# Patient Record
Sex: FEMALE | Race: WHITE | NOT HISPANIC OR LATINO | Employment: UNEMPLOYED | URBAN - METROPOLITAN AREA
[De-identification: names, ages, dates, MRNs, and addresses within clinical notes are randomized per-mention and may not be internally consistent; named-entity substitution may affect disease eponyms.]

---
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
## 2020-05-11 PROBLEM — Z3A.38 38 WEEKS GESTATION OF PREGNANCY: Status: RESOLVED | Noted: 2019-05-30 | Resolved: 2020-05-11

## 2020-09-10 ENCOUNTER — OFFICE VISIT (OUTPATIENT)
Dept: OBSTETRICS AND GYNECOLOGY | Facility: CLINIC | Age: 20
End: 2020-09-10
Payer: MEDICAID

## 2020-09-10 VITALS
SYSTOLIC BLOOD PRESSURE: 126 MMHG | DIASTOLIC BLOOD PRESSURE: 70 MMHG | HEIGHT: 60 IN | WEIGHT: 154.63 LBS | BODY MASS INDEX: 30.36 KG/M2

## 2020-09-10 DIAGNOSIS — Z12.4 ROUTINE PAPANICOLAOU SMEAR: Primary | ICD-10-CM

## 2020-09-10 PROCEDURE — 99213 OFFICE O/P EST LOW 20 MIN: CPT | Mod: PBBFAC,PO | Performed by: OBSTETRICS & GYNECOLOGY

## 2020-09-10 PROCEDURE — 88142 CYTOPATH C/V THIN LAYER: CPT

## 2020-09-10 PROCEDURE — 99999 PR PBB SHADOW E&M-EST. PATIENT-LVL III: ICD-10-PCS | Mod: PBBFAC,,, | Performed by: OBSTETRICS & GYNECOLOGY

## 2020-09-10 PROCEDURE — 99395 PR PREVENTIVE VISIT,EST,18-39: ICD-10-PCS | Mod: S$PBB,,, | Performed by: OBSTETRICS & GYNECOLOGY

## 2020-09-10 PROCEDURE — 99999 PR PBB SHADOW E&M-EST. PATIENT-LVL III: CPT | Mod: PBBFAC,,, | Performed by: OBSTETRICS & GYNECOLOGY

## 2020-09-10 PROCEDURE — 99395 PREV VISIT EST AGE 18-39: CPT | Mod: S$PBB,,, | Performed by: OBSTETRICS & GYNECOLOGY

## 2020-09-10 NOTE — PROGRESS NOTES
HPI:   20 y.o.   OB History        1    Para   1    Term   1       0    AB   0    Living   1       SAB   0    TAB   0    Ectopic   0    Multiple   0    Live Births   1              No LMP recorded. Patient has had an implant.    Patient is  here for her annual gynecologic exam.  She has no complaints.     ROS:  GENERAL: No fever, chills, fatigability or weight loss.  SKIN: No rashes, itching or changes in color or texture of skin.  HEAD: No headaches or recent head trauma.  EYES: Visual acuity fine. No photophobia, ocular pain or diplopia.  EARS: Denies ear pain, discharge or vertigo.  NOSE: No loss of smell, no epistaxis or postnasal drip.  MOUTH & THROAT: No hoarseness or change in voice. No excessive gum bleeding.  NODES: Denies swollen glands.  CHEST: Denies VALLE, cyanosis, wheezing, cough and sputum production.  CARDIOVASCULAR: Denies chest pain, PND, orthopnea or reduced exercise tolerance.  ABDOMEN: Appetite fine. No weight loss. Denies diarrhea, abdominal pain, hematemesis or blood in stool.  URINARY: No flank pain, dysuria or hematuria.  PERIPHERAL VASCULAR: No claudication or cyanosis.  MUSCULOSKELETAL: No joint stiffness or swelling. Denies back pain.  NEUROLOGIC: No history of seizures, paralysis, alteration of gait or coordination.    PE:   /70   Ht 5' (1.524 m)   Wt 70.1 kg (154 lb 9.6 oz)   Breastfeeding No   BMI 30.19 kg/m²   APPEARANCE: Well nourished, well developed, in no acute distress.  NECK: Neck symmetric without masses or thyromegaly.  BREASTS: Symmetrical, no skin changes or visible lesions. No palpable masses, nipple discharge or adenopathy bilaterally.  ABDOMEN: Flat. Soft. No tenderness or masses. No hepatosplenomegaly. No hernias. No CVA tenderness.  VULVA: No lesions. Normal female genitalia.  URETHRAL MEATUS: Normal size and location, no lesions, no prolapse.  URETHRA: No masses, tenderness, prolapse or scarring.  VAGINA: Moist and well rugated, no discharge, no  significant cystocele or rectocele.  CERVIX: No lesions and discharge. PAP done.  UTERUS: Normal size, regular shape, mobile, non-tender, bladder base nontender.  ADNEXA: No masses, tenderness or CDS nodularity.  ANUS PERINEUM: Normal.    PROCEDURES:  Pap smear    Assessment:  Normal Gynecologic Exam    Plan:  Mammogram and Colonoscopy if indicated by current recommendations.  Return to clinic in one year or for any problems or complaints.  nexplanon  prev abn pap

## 2020-09-17 ENCOUNTER — TELEPHONE (OUTPATIENT)
Dept: OBSTETRICS AND GYNECOLOGY | Facility: CLINIC | Age: 20
End: 2020-09-17

## 2020-09-17 NOTE — TELEPHONE ENCOUNTER
----- Message from Sharon Gregory sent at 9/17/2020 11:27 AM CDT -----  Regarding: pap results  Type:  Needs Medical Advice    Who Called: patient   Reason: patient would like a call back concerning annual check up results  Would the patient rather a call back or a response via [facility]? Call back   Best Call Back Number: [phone]  Additional Information: n/a

## 2020-09-22 ENCOUNTER — TELEPHONE (OUTPATIENT)
Dept: OBSTETRICS AND GYNECOLOGY | Facility: CLINIC | Age: 20
End: 2020-09-22

## 2020-09-22 NOTE — TELEPHONE ENCOUNTER
----- Message from Promise Mathews sent at 9/22/2020  9:11 AM CDT -----  Type:  Needs Medical Advice    Who Called: self  Reason:test results   Would the patient rather a call back or a response via [facility]? Call  Best Call Back Number: [phone]  Additional Information: none

## 2020-09-22 NOTE — TELEPHONE ENCOUNTER
Pt notified that her pap results are not back yet and we send out a letter if it comes back normal.    Pt is requesting a call about her pap letter

## 2020-09-26 LAB
FINAL PATHOLOGIC DIAGNOSIS: NORMAL
Lab: NORMAL

## 2020-09-28 ENCOUNTER — TELEPHONE (OUTPATIENT)
Dept: OBSTETRICS AND GYNECOLOGY | Facility: CLINIC | Age: 20
End: 2020-09-28

## 2020-09-28 NOTE — TELEPHONE ENCOUNTER
----- Message from Promise Mathews sent at 9/28/2020 10:40 AM CDT -----  Type:  Needs Medical Advice     Who Called: self  Reason:test results   Would the patient rather a call back or a response via [facility]? Call  Best Call Back Number: [phone]  Additional Information: none

## 2020-09-28 NOTE — TELEPHONE ENCOUNTER
----- Message from Ursula Coulter sent at 9/28/2020  1:27 PM CDT -----  Regarding: pt call back  Contact: pt  Patient Returning Call    Who Called pt   Who Left Message For Patient If Any racquel alicia  Does The Patient Know What This Is Regarding not sure   Would The Patient Rather A Call Back Or [facility] [phone]   Additional Information

## 2020-09-28 NOTE — TELEPHONE ENCOUNTER
----- Message from Rosita Tabor sent at 9/28/2020 10:51 AM CDT -----  Contact: pt, [phone] (M)  Patient called in returning your call. Please advise.

## 2020-12-03 ENCOUNTER — TELEPHONE (OUTPATIENT)
Dept: OBSTETRICS AND GYNECOLOGY | Facility: CLINIC | Age: 20
End: 2020-12-03

## 2020-12-03 NOTE — TELEPHONE ENCOUNTER
Pt states that she hasn't had a cycle in over a year and now she is having a dark discharge. Pt notified this is probably just old blood because she hasn't had a cycle in a year

## 2020-12-03 NOTE — TELEPHONE ENCOUNTER
----- Message from Melba Bueno sent at 12/3/2020  9:49 AM CST -----  Type:  Needs Medical Advice    Who Called: pt  Advice Regarding: no period since nexplanon, now having blood clots  Would the patient rather a call back or a response via [facility]? call  Best Call Back Number:   Additional Information: n/a

## 2021-01-23 ENCOUNTER — NURSE TRIAGE (OUTPATIENT)
Dept: ADMINISTRATIVE | Facility: CLINIC | Age: 21
End: 2021-01-23

## 2021-01-25 RX ORDER — NORETHINDRONE ACETATE AND ETHINYL ESTRADIOL .02; 1 MG/1; MG/1
1 TABLET ORAL DAILY
Qty: 30 TABLET | Refills: 0 | OUTPATIENT
Start: 2021-01-25 | End: 2022-02-08

## 2022-01-18 ENCOUNTER — HOSPITAL ENCOUNTER (EMERGENCY)
Facility: HOSPITAL | Age: 22
Discharge: HOME | End: 2022-01-18
Attending: EMERGENCY MEDICINE
Payer: MEDICAID

## 2022-01-18 VITALS
TEMPERATURE: 98 F | HEART RATE: 83 BPM | HEIGHT: 60 IN | BODY MASS INDEX: 29.45 KG/M2 | RESPIRATION RATE: 16 BRPM | SYSTOLIC BLOOD PRESSURE: 123 MMHG | WEIGHT: 150 LBS | OXYGEN SATURATION: 100 % | DIASTOLIC BLOOD PRESSURE: 58 MMHG

## 2022-01-18 DIAGNOSIS — R21 RASH: Primary | ICD-10-CM

## 2022-01-18 PROCEDURE — 99283 EMERGENCY DEPT VISIT LOW MDM: CPT

## 2022-01-18 RX ORDER — PERMETHRIN 50 MG/G
CREAM TOPICAL
Qty: 60 G | Refills: 2 | OUTPATIENT
Start: 2022-01-18 | End: 2022-02-08

## 2022-01-18 NOTE — ED PROVIDER NOTES
Encounter Date: 2022    SCRIBE #1 NOTE: I, Negro Robles, am scribing for, and in the presence of, Arin Almendarez PA-C.       History     Chief Complaint   Patient presents with    Rash     Pt with rash x 4 days. Took benadryl yesterday with no relief      Kaye Long is a 21 y.o. female who presents to the Emergency Department for evaluation of worsening generalized hives persisting for 1 week. Patient states that the hives began on her wrists and have now spread across her body. She endorses the use of calamine lotion and benadryl with no relief. She states her daughter is exhibiting similar hives. Patient expresses concerns that their hives are secondary to moving upstairs 1 week ago where their previous room mates once lived. She describes that she feels as though something is biting her. Patient reports that her significant other does not have any hives despite sleeping in the same bed. She states the mattress is 2 years old. She does not endorse any new detergents, soaps, or animals in the house. Patient denies any other associated symptoms.    The history is provided by the patient.     Review of patient's allergies indicates:  No Known Allergies  History reviewed. No pertinent past medical history.  Past Surgical History:   Procedure Laterality Date     SECTION N/A 2019    Procedure:  SECTION;  Surgeon: Michael A. Wiedemann, MD;  Location: [facility] L&D;  Service: OB/GYN;  Laterality: N/A;    DENTAL SURGERY       Family History   Problem Relation Age of Onset    Hypertension Father      Social History     Tobacco Use    Smoking status: Passive Smoke Exposure - Never Smoker    Smokeless tobacco: Never Used   Substance Use Topics    Alcohol use: No    Drug use: No     Review of Systems   Constitutional: Negative for fever.   HENT: Negative for sore throat.    Eyes: Negative for pain.   Respiratory: Negative for shortness of breath.    Cardiovascular: Negative for chest  pain.   Gastrointestinal: Negative for abdominal pain and nausea.   Genitourinary: Negative for dysuria.   Musculoskeletal: Negative for back pain.   Skin: Positive for rash.   Neurological: Negative for weakness.   All other systems reviewed and are negative.      Physical Exam     Initial Vitals [01/18/22 1141]   BP Pulse Resp Temp SpO2   (!) 123/58 83 16 97.9 °F (36.6 °C) 100 %      MAP       --         Physical Exam    Nursing note and vitals reviewed.  Constitutional: She appears well-developed and well-nourished. She is not diaphoretic. No distress.   HENT:   Head: Normocephalic and atraumatic.   Nose: Nose normal.   Eyes: EOM are normal. Pupils are equal, round, and reactive to light.   Neck: Neck supple.   Normal range of motion.  Cardiovascular: Normal rate and regular rhythm.   No murmur heard.  Pulmonary/Chest: No respiratory distress.   Abdominal: Abdomen is soft. Bowel sounds are normal. She exhibits no distension. There is no abdominal tenderness. There is no rebound and no guarding.   Musculoskeletal:         General: No tenderness or edema. Normal range of motion.      Cervical back: Normal range of motion and neck supple.     Neurological: She is alert and oriented to person, place, and time. No cranial nerve deficit.   Skin: Skin is warm. Rash noted. No erythema.   There are multiple erythematous insect bites noted to the chest, bilateral upper extremities.         ED Course   Procedures  Labs Reviewed - No data to display       Imaging Results    None          Medications - No data to display  Medical Decision Making:   History:   Old Medical Records: I decided to obtain old medical records.       APC / Resident Notes:   This is an urgent evaluation of a 21-year-old female presents to the Emergency Department complaining of rash.  Symptoms started a few days ago.  Her daughter also has similar symptoms.  Of note, the patient recently moved to a different bedroom of her house were previous people  or living.    The patient is currently afebrile and nontoxic in appearance.  Vital signs are stable.  Physical exam is consistent with multiple small, erythematous bumps to the bilateral upper extremities and chest.  Some appear to be in a linear pattern.  I believe this is bedbugs versus scabies.  I will treat with permethrin and have them look for bedbugs.  The patient's  states that he has seen 2 small dead bed bugs in the past 2 days.  The patient verbalized understanding and agreement.  She is currently safe and stable for discharge at this time.     Scribe Attestation:   Scribe #1: I performed the above scribed service and the documentation accurately describes the services I performed. I attest to the accuracy of the note.                 Clinical Impression:   Final diagnoses:  [R21] Rash (Primary)          ED Disposition Condition    Discharge Stable        ED Prescriptions     Medication Sig Dispense Start Date End Date Auth. Provider    permethrin (ELIMITE) 5 % cream Apply to the body, leave for 8 hours, then wash.  May repeat in one week. 60 g 1/18/2022  Arin Almendarez PA-C        Follow-up Information     Follow up With Specialties Details Why Contact Info    Marita Norris MD Pediatrics   [address]  [place] [address]  [phone]           I, Arin Almendarez PA-C, personally performed the services described in this documentation. All medical record entries made by the scribe were at my direction and in my presence. I have reviewed the chart and agree that the record reflects my personal performance and is accurate and complete.     Arin Almendarez PA-C  01/18/22 1257

## 2022-01-18 NOTE — ED TRIAGE NOTES
Pt. Reports for the past few days she has been having a rash on her body. Pt. Reports she recently moved into an appt and that is when the rash started. Pt. Reports her daughter has the same type of rash.

## 2022-02-08 ENCOUNTER — PATIENT OUTREACH (OUTPATIENT)
Dept: EMERGENCY MEDICINE | Facility: HOSPITAL | Age: 22
End: 2022-02-08

## 2022-02-08 ENCOUNTER — HOSPITAL ENCOUNTER (EMERGENCY)
Facility: HOSPITAL | Age: 22
Discharge: HOME | End: 2022-02-08
Attending: EMERGENCY MEDICINE
Payer: MEDICAID

## 2022-02-08 VITALS
HEART RATE: 100 BPM | OXYGEN SATURATION: 96 % | RESPIRATION RATE: 17 BRPM | SYSTOLIC BLOOD PRESSURE: 126 MMHG | TEMPERATURE: 98 F | DIASTOLIC BLOOD PRESSURE: 64 MMHG

## 2022-02-08 DIAGNOSIS — Z3A.08 8 WEEKS GESTATION OF PREGNANCY: Primary | ICD-10-CM

## 2022-02-08 LAB
B-HCG UR QL: POSITIVE
CTP QC/QA: YES

## 2022-02-08 PROCEDURE — 99282 EMERGENCY DEPT VISIT SF MDM: CPT

## 2022-02-08 PROCEDURE — 81025 URINE PREGNANCY TEST: CPT | Performed by: EMERGENCY MEDICINE

## 2022-02-08 NOTE — ED PROVIDER NOTES
Encounter Date: 2022       History     Chief Complaint   Patient presents with    Possible Pregnancy     20 yo female to triage w/ requesting UPT and CATHERINE. Pt says she took a home UPT and it was positive but wants another test. Also states she already has an appointment set for the . VSS, NAD, AAOx4     20 y/o  female to ED for evaluation of possible pregnancy. Pt states she took a home pregnancy test this morning and it was positive. LMP Dec 9-12. Reports constipation and nausea x 2 weeks. Denies abdominal pain, dysuria, vaginal bleeding, and vaginal discharge. Pt states she would like a pregnancy test and an ultrasound today while in the ED.         Review of patient's allergies indicates:  No Known Allergies  History reviewed. No pertinent past medical history.  Past Surgical History:   Procedure Laterality Date     SECTION N/A 2019    Procedure:  SECTION;  Surgeon: Michael A. Wiedemann, MD;  Location: [facility] L&D;  Service: OB/GYN;  Laterality: N/A;    DENTAL SURGERY       Family History   Problem Relation Age of Onset    Hypertension Father      Social History     Tobacco Use    Smoking status: Passive Smoke Exposure - Never Smoker    Smokeless tobacco: Never Used   Substance Use Topics    Alcohol use: No    Drug use: No     Review of Systems   Constitutional: Negative.    HENT: Negative.    Respiratory: Negative.    Cardiovascular: Negative.    Gastrointestinal: Positive for constipation and nausea.   Genitourinary: Negative.  Negative for dysuria, vaginal bleeding and vaginal discharge.   Musculoskeletal: Negative.    Neurological: Negative.    All other systems reviewed and are negative.      Physical Exam     Initial Vitals [22 1203]   BP Pulse Resp Temp SpO2   126/64 100 17 98.2 °F (36.8 °C) 96 %      MAP       --         Physical Exam    Nursing note and vitals reviewed.  Constitutional: She appears well-developed and well-nourished. No distress.   HENT:   Head:  Normocephalic and atraumatic.   Nose: Nose normal.   Eyes: Conjunctivae are normal.   Neck: Neck supple.   Normal range of motion.  Cardiovascular: Normal rate.   Pulmonary/Chest: No respiratory distress.   Abdominal: Abdomen is soft. There is no abdominal tenderness.   Musculoskeletal:         General: Normal range of motion.      Cervical back: Normal range of motion and neck supple.     Neurological: She is alert and oriented to person, place, and time.   Skin: Skin is warm and dry.   Psychiatric: She has a normal mood and affect. Thought content normal.         ED Course   Procedures  Labs Reviewed   POCT URINE PREGNANCY - Abnormal; Notable for the following components:       Result Value    POC Preg Test, Ur Positive (*)     All other components within normal limits          Imaging Results    None          Medications - No data to display        APC / Resident Notes:   This is an urgent evaluation of a 21-year-old  female presents to the emergency department requesting a pregnancy test.  She adamantly denies any dysuria, abdominal pain, vaginal bleeding or discharge.  She states that she took a pregnancy test at home which was positive.    The patient is currently afebrile and nontoxic in appearance.  Vital signs are stable.  Physical exam was grossly unremarkable.  I carefully considered but doubt spontaneous , ectopic pregnancy.  Her UPT was positive.  I will encourage prenatal vitamins.  The patient verbalized understanding and agreement.  She has an OBGYN follow-up appointment already scheduled.                 Clinical Impression:   Final diagnoses:  [Z3A.08] 8 weeks gestation of pregnancy (Primary)          ED Disposition Condition    Discharge Stable        ED Prescriptions     None        Follow-up Information     Follow up With Specialties Details Why Contact Info    Marita Norris MD Pediatrics   [address]  [place] [address]  [phone]             Arin Almendarez,  TRACI  02/08/22 1716

## 2022-02-08 NOTE — DISCHARGE INSTRUCTIONS
Take medication called Colace over-the-counter to help with constipation.  You may take prenatal vitamins daily.  Follow-up with your OBGYN as scheduled.

## 2022-02-08 NOTE — ED TRIAGE NOTES
Pt. Reports her LMP was on 12/12/2021. Pt. Reports she took a pregnancy test at home and it was positive. Pt. reports she has an appt with her Ob-GYN for next Monday.

## 2022-02-08 NOTE — PROGRESS NOTES
Patient declined ED navigation assessment and denied any needs at this time.     Ingrid Gastelum  ED Navigator  [facility]  [phone]

## 2022-02-17 ENCOUNTER — HOSPITAL ENCOUNTER (EMERGENCY)
Facility: HOSPITAL | Age: 22
Discharge: HOME | End: 2022-02-17
Attending: EMERGENCY MEDICINE
Payer: MEDICAID

## 2022-02-17 VITALS
BODY MASS INDEX: 30.82 KG/M2 | WEIGHT: 157 LBS | SYSTOLIC BLOOD PRESSURE: 113 MMHG | TEMPERATURE: 98 F | RESPIRATION RATE: 18 BRPM | HEIGHT: 60 IN | DIASTOLIC BLOOD PRESSURE: 69 MMHG | HEART RATE: 100 BPM | OXYGEN SATURATION: 99 %

## 2022-02-17 DIAGNOSIS — O20.0 THREATENED MISCARRIAGE IN EARLY PREGNANCY: Primary | ICD-10-CM

## 2022-02-17 LAB
ABO GROUP BLD: NORMAL
ALBUMIN SERPL BCP-MCNC: 4.1 G/DL (ref 3.5–5.2)
ALP SERPL-CCNC: 80 U/L (ref 55–135)
ALT SERPL W/O P-5'-P-CCNC: 10 U/L (ref 10–44)
ANION GAP SERPL CALC-SCNC: 11 MMOL/L (ref 8–16)
AST SERPL-CCNC: 12 U/L (ref 10–40)
B-HCG UR QL: POSITIVE
BACTERIA #/AREA URNS HPF: ABNORMAL /HPF
BASOPHILS # BLD AUTO: 0.06 K/UL (ref 0–0.2)
BASOPHILS NFR BLD: 0.4 % (ref 0–1.9)
BILIRUB SERPL-MCNC: 0.3 MG/DL (ref 0.1–1)
BILIRUB UR QL STRIP: NEGATIVE
BUN SERPL-MCNC: 10 MG/DL (ref 6–20)
CALCIUM SERPL-MCNC: 9.5 MG/DL (ref 8.7–10.5)
CHLORIDE SERPL-SCNC: 104 MMOL/L (ref 95–110)
CLARITY UR: ABNORMAL
CO2 SERPL-SCNC: 23 MMOL/L (ref 23–29)
COLOR UR: ABNORMAL
CREAT SERPL-MCNC: 0.8 MG/DL (ref 0.5–1.4)
CTP QC/QA: YES
DIFFERENTIAL METHOD: ABNORMAL
EOSINOPHIL # BLD AUTO: 0.4 K/UL (ref 0–0.5)
EOSINOPHIL NFR BLD: 2.2 % (ref 0–8)
ERYTHROCYTE [DISTWIDTH] IN BLOOD BY AUTOMATED COUNT: 11.7 % (ref 11.5–14.5)
EST. GFR  (AFRICAN AMERICAN): >60 ML/MIN/1.73 M^2
EST. GFR  (NON AFRICAN AMERICAN): >60 ML/MIN/1.73 M^2
GLUCOSE SERPL-MCNC: 120 MG/DL (ref 70–110)
GLUCOSE UR QL STRIP: ABNORMAL
HCG INTACT+B SERPL-ACNC: NORMAL MIU/ML
HCT VFR BLD AUTO: 41.3 % (ref 37–48.5)
HGB BLD-MCNC: 14.4 G/DL (ref 12–16)
HGB UR QL STRIP: ABNORMAL
HYALINE CASTS #/AREA URNS LPF: 0 /LPF
IMM GRANULOCYTES # BLD AUTO: 0.06 K/UL (ref 0–0.04)
IMM GRANULOCYTES NFR BLD AUTO: 0.4 % (ref 0–0.5)
KETONES UR QL STRIP: ABNORMAL
LEUKOCYTE ESTERASE UR QL STRIP: ABNORMAL
LYMPHOCYTES # BLD AUTO: 3.1 K/UL (ref 1–4.8)
LYMPHOCYTES NFR BLD: 18.4 % (ref 18–48)
MCH RBC QN AUTO: 30.6 PG (ref 27–31)
MCHC RBC AUTO-ENTMCNC: 34.9 G/DL (ref 32–36)
MCV RBC AUTO: 88 FL (ref 82–98)
MICROSCOPIC COMMENT: ABNORMAL
MONOCYTES # BLD AUTO: 1.1 K/UL (ref 0.3–1)
MONOCYTES NFR BLD: 6.5 % (ref 4–15)
NEUTROPHILS # BLD AUTO: 12.3 K/UL (ref 1.8–7.7)
NEUTROPHILS NFR BLD: 72.1 % (ref 38–73)
NITRITE UR QL STRIP: NEGATIVE
NRBC BLD-RTO: 0 /100 WBC
PH UR STRIP: 6 [PH] (ref 5–8)
PLATELET # BLD AUTO: 341 K/UL (ref 150–450)
PMV BLD AUTO: 9.6 FL (ref 9.2–12.9)
POTASSIUM SERPL-SCNC: 3.7 MMOL/L (ref 3.5–5.1)
PROT SERPL-MCNC: 7.3 G/DL (ref 6–8.4)
PROT UR QL STRIP: ABNORMAL
RBC # BLD AUTO: 4.7 M/UL (ref 4–5.4)
RBC #/AREA URNS HPF: >100 /HPF (ref 0–4)
RH BLD: NORMAL
SODIUM SERPL-SCNC: 138 MMOL/L (ref 136–145)
SP GR UR STRIP: >1.03 (ref 1–1.03)
URN SPEC COLLECT METH UR: ABNORMAL
UROBILINOGEN UR STRIP-ACNC: ABNORMAL EU/DL
WBC # BLD AUTO: 17 K/UL (ref 3.9–12.7)
WBC #/AREA URNS HPF: >100 /HPF (ref 0–5)

## 2022-02-17 PROCEDURE — 85025 COMPLETE CBC W/AUTO DIFF WBC: CPT | Performed by: EMERGENCY MEDICINE

## 2022-02-17 PROCEDURE — 80053 COMPREHEN METABOLIC PANEL: CPT | Performed by: EMERGENCY MEDICINE

## 2022-02-17 PROCEDURE — 96360 HYDRATION IV INFUSION INIT: CPT

## 2022-02-17 PROCEDURE — 84702 CHORIONIC GONADOTROPIN TEST: CPT | Performed by: EMERGENCY MEDICINE

## 2022-02-17 PROCEDURE — 81000 URINALYSIS NONAUTO W/SCOPE: CPT | Performed by: EMERGENCY MEDICINE

## 2022-02-17 PROCEDURE — 25000003 PHARM REV CODE 250: Performed by: EMERGENCY MEDICINE

## 2022-02-17 PROCEDURE — 81025 URINE PREGNANCY TEST: CPT | Performed by: EMERGENCY MEDICINE

## 2022-02-17 PROCEDURE — 86901 BLOOD TYPING SEROLOGIC RH(D): CPT | Performed by: EMERGENCY MEDICINE

## 2022-02-17 PROCEDURE — 99285 EMERGENCY DEPT VISIT HI MDM: CPT | Mod: 25

## 2022-02-17 RX ADMIN — SODIUM CHLORIDE 1000 ML: 0.9 INJECTION, SOLUTION INTRAVENOUS at 02:02

## 2022-02-17 NOTE — Clinical Note
Nick Cortes accompanied their spouse to the emergency department on 2/17/2022. They may return to work on 02/18/2022.      If you have any questions or concerns, please don't hesitate to call.      CHARITY Hammer RN

## 2022-02-17 NOTE — ED PROVIDER NOTES
Encounter Date: 2022    SCRIBE #1 NOTE: I, Hank Duckworth, am scribing for, and in the presence of,  Jerman Torres MD. I have scribed the following portions of the note - Other sections scribed: HPI, ROS, PE.       History     Chief Complaint   Patient presents with    Vaginal Bleeding     Presents with complaint of vaginal bleeding in the amount as if she was having a menstrual cycle, states 10 weeks pregnant, denies any pain     Kaye Izquierdo is a 21 y. o female with no pertinent PMHx, that comes to the ED complaining of vaginal bleeding beginning today. Patient states she is currently 10 weeks pregnant and notes heavy bleeding similar to menstrual cycle. No medications taken PTA. No alleviating or exacerbating factors noted. Denies dysuria, or abdominal pain. Patient states this is her second pregnancy, endorsing she didn't experience any vaginal bleeding with the first one. No known allergies.    The history is provided by the patient. No  was used.     Review of patient's allergies indicates:  No Known Allergies  No past medical history on file.  Past Surgical History:   Procedure Laterality Date     SECTION N/A 2019    Procedure:  SECTION;  Surgeon: Michael A. Wiedemann, MD;  Location: [facility] L&D;  Service: OB/GYN;  Laterality: N/A;    DENTAL SURGERY       Family History   Problem Relation Age of Onset    Hypertension Father      Social History     Tobacco Use    Smoking status: Passive Smoke Exposure - Never Smoker    Smokeless tobacco: Never Used   Substance Use Topics    Alcohol use: No    Drug use: No     Review of Systems   Constitutional: Negative.    HENT: Negative.    Eyes: Negative.    Respiratory: Negative.    Cardiovascular: Negative.    Gastrointestinal: Negative.    Endocrine: Negative.    Genitourinary: Positive for vaginal bleeding.   Musculoskeletal: Negative.    Skin: Negative.    Allergic/Immunologic: Negative.    Neurological: Negative.     Hematological: Negative.    Psychiatric/Behavioral: Negative.    All other systems reviewed and are negative.      Physical Exam     Initial Vitals [02/17/22 0158]   BP Pulse Resp Temp SpO2   130/62 (!) 111 18 98.1 °F (36.7 °C) 98 %      MAP       --         Physical Exam    Constitutional: Vital signs are normal. She appears well-developed and well-nourished. She is not diaphoretic. She is active and cooperative. No distress.   HENT:   Head: Normocephalic and atraumatic.   Mouth/Throat: Oropharynx is clear and moist.   Eyes: Conjunctivae, EOM and lids are normal. Pupils are equal, round, and reactive to light.   Neck: Trachea normal. Neck supple. No thyroid mass present.    Full passive range of motion without pain.     Cardiovascular: Normal rate, regular rhythm, S1 normal, S2 normal, normal heart sounds, intact distal pulses and normal pulses.   Pulmonary/Chest: Effort normal and breath sounds normal. No respiratory distress.   Abdominal: Abdomen is soft. Normal appearance, normal aorta and bowel sounds are normal.   Normal aorta.   Genitourinary:    Genitourinary Comments: Deferred palpation.     Musculoskeletal:         General: No edema. Normal range of motion.      Cervical back: Full passive range of motion without pain and neck supple.     Lymphadenopathy:     She has no axillary adenopathy.   Neurological: She is alert and oriented to person, place, and time.   Skin: Skin is warm, dry and intact.   Psychiatric: She has a normal mood and affect. Her speech is normal and behavior is normal. Judgment and thought content normal. Cognition and memory are normal.         ED Course   Procedures  Labs Reviewed   CBC W/ AUTO DIFFERENTIAL - Abnormal; Notable for the following components:       Result Value    WBC 17.00 (*)     Gran # (ANC) 12.3 (*)     Immature Grans (Abs) 0.06 (*)     Mono # 1.1 (*)     All other components within normal limits    Narrative:     Release to patient->Immediate   COMPREHENSIVE  METABOLIC PANEL - Abnormal; Notable for the following components:    Glucose 120 (*)     All other components within normal limits    Narrative:     Release to patient->Immediate   URINALYSIS - Abnormal; Notable for the following components:    Color, UA Orange (*)     Appearance, UA Cloudy (*)     Specific Gravity, UA >1.030 (*)     Protein, UA 1+ (*)     Glucose, UA 1+ (*)     Ketones, UA Trace (*)     Occult Blood UA 3+ (*)     Urobilinogen, UA 2.0-3.0 (*)     Leukocytes, UA Trace (*)     All other components within normal limits   URINALYSIS MICROSCOPIC - Abnormal; Notable for the following components:    RBC, UA >100 (*)     WBC, UA >100 (*)     Bacteria Moderate (*)     All other components within normal limits   POCT URINE PREGNANCY - Abnormal; Notable for the following components:    POC Preg Test, Ur Positive (*)     All other components within normal limits   HCG, QUANTITATIVE    Narrative:     Release to patient->Immediate   GROUP & RH          Imaging Results          US OB <14 Wks TransAbd & TransVag, Single Gestation (XPD) (Final result)  Result time 02/17/22 03:55:02    Final result by Daenna Cid MD (02/17/22 03:55:02)                 Impression:      Single live intrauterine pregnancy with estimated gestational age 8 weeks 4 days by crown-rump length.  Fetal heart rate 167 BPM.      Electronically signed by: Deanna Cid MD  Date:    02/17/2022  Time:    03:55             Narrative:    EXAMINATION:  US OB <14 WEEKS, TRANSABDOM & TRANSVAG, SINGLE GESTATION (XPD)    CLINICAL HISTORY:  Vag Bleeding;    TECHNIQUE:  Transabdominal sonography of the pelvis was performed, followed by transvaginal sonography to better evaluate the uterus and ovaries.    COMPARISON:  None.    FINDINGS:  The uterus measures 10.6 x 5.7 x 7.4 cm. Uterine parenchyma is homogeneous. In the uterine cavity there is a gestational sac with a mean diameter of 2.5 cm. The fetal pole measures 1.9 cm by crown rump length and  corresponds to a 8 weeks 4 days gestation. Fetal heart rate is 167 BPM. The yolk sac measures 0.3 cm.  No subchorionic hemorrhage identified.    The right ovary measures 3.6 x 3.1 x 2.4 cm. The left ovary measures 3.1 x 1.9 x 2.8 cm. Arterial and venous flow are preserved bilaterally.  No free fluid is seen.                                 Medications   sodium chloride 0.9% bolus 1,000 mL (0 mLs Intravenous Stopped 2/17/22 6303)     Medical Decision Making:   History:   Old Medical Records: I decided to obtain old medical records.  Clinical Tests:   Lab Tests: Ordered and Reviewed  Radiological Study: Ordered and Reviewed          Scribe Attestation:   Scribe #1: I performed the above scribed service and the documentation accurately describes the services I performed. I attest to the accuracy of the note.            Results for orders placed or performed during the hospital encounter of 02/17/22   CBC W/ AUTO DIFFERENTIAL   Result Value Ref Range    WBC 17.00 (H) 3.90 - 12.70 K/uL    RBC 4.70 4.00 - 5.40 M/uL    Hemoglobin 14.4 12.0 - 16.0 g/dL    Hematocrit 41.3 37.0 - 48.5 %    MCV 88 82 - 98 fL    MCH 30.6 27.0 - 31.0 pg    MCHC 34.9 32.0 - 36.0 g/dL    RDW 11.7 11.5 - 14.5 %    Platelets 341 150 - 450 K/uL    MPV 9.6 9.2 - 12.9 fL    Immature Granulocytes 0.4 0.0 - 0.5 %    Gran # (ANC) 12.3 (H) 1.8 - 7.7 K/uL    Immature Grans (Abs) 0.06 (H) 0.00 - 0.04 K/uL    Lymph # 3.1 1.0 - 4.8 K/uL    Mono # 1.1 (H) 0.3 - 1.0 K/uL    Eos # 0.4 0.0 - 0.5 K/uL    Baso # 0.06 0.00 - 0.20 K/uL    nRBC 0 0 /100 WBC    Gran % 72.1 38.0 - 73.0 %    Lymph % 18.4 18.0 - 48.0 %    Mono % 6.5 4.0 - 15.0 %    Eosinophil % 2.2 0.0 - 8.0 %    Basophil % 0.4 0.0 - 1.9 %    Differential Method Automated    Comp. Metabolic Panel   Result Value Ref Range    Sodium 138 136 - 145 mmol/L    Potassium 3.7 3.5 - 5.1 mmol/L    Chloride 104 95 - 110 mmol/L    CO2 23 23 - 29 mmol/L    Glucose 120 (H) 70 - 110 mg/dL    BUN 10 6 - 20 mg/dL     Creatinine 0.8 0.5 - 1.4 mg/dL    Calcium 9.5 8.7 - 10.5 mg/dL    Total Protein 7.3 6.0 - 8.4 g/dL    Albumin 4.1 3.5 - 5.2 g/dL    Total Bilirubin 0.3 0.1 - 1.0 mg/dL    Alkaline Phosphatase 80 55 - 135 U/L    AST 12 10 - 40 U/L    ALT 10 10 - 44 U/L    Anion Gap 11 8 - 16 mmol/L    eGFR if African American >60 >60 mL/min/1.73 m^2    eGFR if non African American >60 >60 mL/min/1.73 m^2   hCG, quantitative   Result Value Ref Range    HCG Quant 98096 See Text mIU/mL   Urinalysis   Result Value Ref Range    Specimen UA Urine, Clean Catch     Color, UA Orange (A) Yellow, Straw, Keyla    Appearance, UA Cloudy (A) Clear    pH, UA 6.0 5.0 - 8.0    Specific Gravity, UA >1.030 (A) 1.005 - 1.030    Protein, UA 1+ (A) Negative    Glucose, UA 1+ (A) Negative    Ketones, UA Trace (A) Negative    Bilirubin (UA) Negative Negative    Occult Blood UA 3+ (A) Negative    Nitrite, UA Negative Negative    Urobilinogen, UA 2.0-3.0 (A) <2.0 EU/dL    Leukocytes, UA Trace (A) Negative   Urinalysis Microscopic   Result Value Ref Range    RBC, UA >100 (H) 0 - 4 /hpf    WBC, UA >100 (H) 0 - 5 /hpf    Bacteria Moderate (A) None-Occ /hpf    Hyaline Casts, UA 0 0-1/lpf /lpf    Microscopic Comment SEE COMMENT    POCT urine pregnancy   Result Value Ref Range    POC Preg Test, Ur Positive (A) Negative     Acceptable Yes    Group & Rh   Result Value Ref Range    ABO O     Rh Type POS             Imaging Results          US OB <14 Wks TransAbd & TransVag, Single Gestation (XPD) (Final result)  Result time 02/17/22 03:55:02    Final result by Deanna Cid MD (02/17/22 03:55:02)                 Impression:      Single live intrauterine pregnancy with estimated gestational age 8 weeks 4 days by crown-rump length.  Fetal heart rate 167 BPM.      Electronically signed by: Deanna Cid MD  Date:    02/17/2022  Time:    03:55             Narrative:    EXAMINATION:  US OB <14 WEEKS, TRANSABDOM & TRANSVAG, SINGLE GESTATION (XPD)    CLINICAL  HISTORY:  Vag Bleeding;    TECHNIQUE:  Transabdominal sonography of the pelvis was performed, followed by transvaginal sonography to better evaluate the uterus and ovaries.    COMPARISON:  None.    FINDINGS:  The uterus measures 10.6 x 5.7 x 7.4 cm. Uterine parenchyma is homogeneous. In the uterine cavity there is a gestational sac with a mean diameter of 2.5 cm. The fetal pole measures 1.9 cm by crown rump length and corresponds to a 8 weeks 4 days gestation. Fetal heart rate is 167 BPM. The yolk sac measures 0.3 cm.  No subchorionic hemorrhage identified.    The right ovary measures 3.6 x 3.1 x 2.4 cm. The left ovary measures 3.1 x 1.9 x 2.8 cm. Arterial and venous flow are preserved bilaterally.  No free fluid is seen.                                Clinical Impression:   Final diagnoses:  [O20.0] Threatened miscarriage in early pregnancy (Primary)       I, Jerman Torres MD, personally performed the services described in this documentation. All medical record entries made by the scribe were at my direction and in my presence. I have reviewed the chart and agree that the record reflects my personal performance and is accurate and complete.     ED Disposition Condition    Discharge Stable        ED Prescriptions     None        Follow-up Information     Follow up With Specialties Details Why Contact Info    Your obstetrician  Schedule an appointment as soon as possible for a visit in 3 days             Jerman Torres MD  02/17/22 0420

## 2022-02-23 ENCOUNTER — LAB VISIT (OUTPATIENT)
Dept: LAB | Facility: HOSPITAL | Age: 22
End: 2022-02-23
Attending: OBSTETRICS & GYNECOLOGY
Payer: MEDICAID

## 2022-02-23 ENCOUNTER — INITIAL PRENATAL (OUTPATIENT)
Dept: OBSTETRICS AND GYNECOLOGY | Facility: CLINIC | Age: 22
End: 2022-02-23
Payer: MEDICAID

## 2022-02-23 VITALS
SYSTOLIC BLOOD PRESSURE: 100 MMHG | HEART RATE: 70 BPM | DIASTOLIC BLOOD PRESSURE: 54 MMHG | BODY MASS INDEX: 30.72 KG/M2 | WEIGHT: 157.31 LBS

## 2022-02-23 DIAGNOSIS — Z3A.09 9 WEEKS GESTATION OF PREGNANCY: ICD-10-CM

## 2022-02-23 DIAGNOSIS — O34.219 H/O CESAREAN SECTION COMPLICATING PREGNANCY: ICD-10-CM

## 2022-02-23 DIAGNOSIS — Z3A.09 9 WEEKS GESTATION OF PREGNANCY: Primary | ICD-10-CM

## 2022-02-23 DIAGNOSIS — O99.330 SMOKING (TOBACCO) COMPLICATING PREGNANCY, UNSPECIFIED TRIMESTER: ICD-10-CM

## 2022-02-23 PROBLEM — Z34.90 PREGNANT: Status: RESOLVED | Noted: 2019-05-27 | Resolved: 2022-02-23

## 2022-02-23 PROBLEM — Z34.90 PREGNANCY WITH ONE FETUS, ANTEPARTUM: Status: ACTIVE | Noted: 2022-02-23

## 2022-02-23 LAB
ABO + RH BLD: NORMAL
ALBUMIN SERPL BCP-MCNC: 4.3 G/DL (ref 3.5–5.2)
ALP SERPL-CCNC: 79 U/L (ref 55–135)
ALT SERPL W/O P-5'-P-CCNC: 12 U/L (ref 10–44)
ANION GAP SERPL CALC-SCNC: 12 MMOL/L (ref 8–16)
AST SERPL-CCNC: 10 U/L (ref 10–40)
BASOPHILS # BLD AUTO: 0.05 K/UL (ref 0–0.2)
BASOPHILS NFR BLD: 0.4 % (ref 0–1.9)
BILIRUB SERPL-MCNC: 0.6 MG/DL (ref 0.1–1)
BLD GP AB SCN CELLS X3 SERPL QL: NORMAL
BUN SERPL-MCNC: 6 MG/DL (ref 6–20)
CALCIUM SERPL-MCNC: 9.7 MG/DL (ref 8.7–10.5)
CHLORIDE SERPL-SCNC: 105 MMOL/L (ref 95–110)
CO2 SERPL-SCNC: 22 MMOL/L (ref 23–29)
CREAT SERPL-MCNC: 0.6 MG/DL (ref 0.5–1.4)
DIFFERENTIAL METHOD: ABNORMAL
EOSINOPHIL # BLD AUTO: 0.2 K/UL (ref 0–0.5)
EOSINOPHIL NFR BLD: 1.2 % (ref 0–8)
ERYTHROCYTE [DISTWIDTH] IN BLOOD BY AUTOMATED COUNT: 12 % (ref 11.5–14.5)
EST. GFR  (AFRICAN AMERICAN): >60 ML/MIN/1.73 M^2
EST. GFR  (NON AFRICAN AMERICAN): >60 ML/MIN/1.73 M^2
GLUCOSE SERPL-MCNC: 93 MG/DL (ref 70–110)
HCT VFR BLD AUTO: 43.7 % (ref 37–48.5)
HGB BLD-MCNC: 14.9 G/DL (ref 12–16)
IMM GRANULOCYTES # BLD AUTO: 0.05 K/UL (ref 0–0.04)
IMM GRANULOCYTES NFR BLD AUTO: 0.4 % (ref 0–0.5)
LYMPHOCYTES # BLD AUTO: 2.2 K/UL (ref 1–4.8)
LYMPHOCYTES NFR BLD: 17.7 % (ref 18–48)
MCH RBC QN AUTO: 30.6 PG (ref 27–31)
MCHC RBC AUTO-ENTMCNC: 34.1 G/DL (ref 32–36)
MCV RBC AUTO: 90 FL (ref 82–98)
MONOCYTES # BLD AUTO: 0.6 K/UL (ref 0.3–1)
MONOCYTES NFR BLD: 4.5 % (ref 4–15)
NEUTROPHILS # BLD AUTO: 9.4 K/UL (ref 1.8–7.7)
NEUTROPHILS NFR BLD: 75.8 % (ref 38–73)
NRBC BLD-RTO: 0 /100 WBC
PLATELET # BLD AUTO: 340 K/UL (ref 150–450)
PMV BLD AUTO: 9.3 FL (ref 9.2–12.9)
POTASSIUM SERPL-SCNC: 4 MMOL/L (ref 3.5–5.1)
PROT SERPL-MCNC: 7.7 G/DL (ref 6–8.4)
RBC # BLD AUTO: 4.87 M/UL (ref 4–5.4)
SODIUM SERPL-SCNC: 139 MMOL/L (ref 136–145)
WBC # BLD AUTO: 12.35 K/UL (ref 3.9–12.7)

## 2022-02-23 PROCEDURE — 87340 HEPATITIS B SURFACE AG IA: CPT | Performed by: OBSTETRICS & GYNECOLOGY

## 2022-02-23 PROCEDURE — 85025 COMPLETE CBC W/AUTO DIFF WBC: CPT | Performed by: OBSTETRICS & GYNECOLOGY

## 2022-02-23 PROCEDURE — 86850 RBC ANTIBODY SCREEN: CPT | Performed by: OBSTETRICS & GYNECOLOGY

## 2022-02-23 PROCEDURE — 86592 SYPHILIS TEST NON-TREP QUAL: CPT | Performed by: OBSTETRICS & GYNECOLOGY

## 2022-02-23 PROCEDURE — 80053 COMPREHEN METABOLIC PANEL: CPT | Performed by: OBSTETRICS & GYNECOLOGY

## 2022-02-23 PROCEDURE — 99213 OFFICE O/P EST LOW 20 MIN: CPT | Mod: PBBFAC,TH | Performed by: OBSTETRICS & GYNECOLOGY

## 2022-02-23 PROCEDURE — 83036 HEMOGLOBIN GLYCOSYLATED A1C: CPT | Performed by: OBSTETRICS & GYNECOLOGY

## 2022-02-23 PROCEDURE — 99999 PR PBB SHADOW E&M-EST. PATIENT-LVL III: ICD-10-PCS | Mod: PBBFAC,,, | Performed by: OBSTETRICS & GYNECOLOGY

## 2022-02-23 PROCEDURE — 99204 OFFICE O/P NEW MOD 45 MIN: CPT | Mod: TH,S$PBB,, | Performed by: OBSTETRICS & GYNECOLOGY

## 2022-02-23 PROCEDURE — 36415 COLL VENOUS BLD VENIPUNCTURE: CPT | Performed by: OBSTETRICS & GYNECOLOGY

## 2022-02-23 PROCEDURE — 99999 PR PBB SHADOW E&M-EST. PATIENT-LVL III: CPT | Mod: PBBFAC,,, | Performed by: OBSTETRICS & GYNECOLOGY

## 2022-02-23 PROCEDURE — 86803 HEPATITIS C AB TEST: CPT | Performed by: OBSTETRICS & GYNECOLOGY

## 2022-02-23 PROCEDURE — 86762 RUBELLA ANTIBODY: CPT | Performed by: OBSTETRICS & GYNECOLOGY

## 2022-02-23 PROCEDURE — 99204 PR OFFICE/OUTPT VISIT, NEW, LEVL IV, 45-59 MIN: ICD-10-PCS | Mod: TH,S$PBB,, | Performed by: OBSTETRICS & GYNECOLOGY

## 2022-02-23 PROCEDURE — 87389 HIV-1 AG W/HIV-1&-2 AB AG IA: CPT | Performed by: OBSTETRICS & GYNECOLOGY

## 2022-02-23 NOTE — PROGRESS NOTES
22 y.o.  at 9w3d here for initial OB visit.    Pregnancy complicated by:   x 1, declined TOLAC  Smokes tobacco    OB History    Para Term  AB Living   2 1 1 0 0 1   SAB IAB Ectopic Multiple Live Births   0 0 0 0 1      # Outcome Date GA Lbr Matt/2nd Weight Sex Delivery Anes PTL Lv   2 Current            1 Term 19 38w6d  3.186 kg (7 lb 0.4 oz) F CS-LTranv Spinal, EPI N RONEY      Complications: Failure to Progress in Second Stage      Name: MCKAY CAZARES      Apgar1: 9  Apgar5: 9     PMH - none  PSH - none  MEDS - PNV  ALL - NKDA  SH - smokes tobacco; denies etoh, drugs  FH - denies congenital defects, aneuploidy   GYN Hx - denies STI, abn pap    Pt has no major complaints today.  Denies vaginal bleeding, leakage of fluid, or contractions.    Order initial OB lab profile.  Pt declined first trimester aneuploidy screening.  Dating US completed.    Pt declined TOLAC.  Risks, benefits, and alternatives to repeat  delivery discussed.    Harmful effects of smoking on her pregnancy discussed.  Pt was advised to quit.  Pt declined help.  Discussed Connective MOM program.  Encourage Covid and flu vaccine.    Principles of prenatal care and precautions reviewed.  Return 4 wks or sooner prn.  All questions answered, voiced understanding.      Keaton Mcallister MD

## 2022-02-24 LAB
ESTIMATED AVG GLUCOSE: 91 MG/DL (ref 68–131)
HBA1C MFR BLD: 4.8 % (ref 4–5.6)

## 2022-02-25 LAB
HBV SURFACE AG SERPL QL IA: NEGATIVE
HCV AB SERPL QL IA: NEGATIVE
HIV 1+2 AB+HIV1 P24 AG SERPL QL IA: NEGATIVE
RPR SER QL: NORMAL
RUBV IGG SER-ACNC: 34.5 IU/ML
RUBV IGG SER-IMP: REACTIVE

## 2022-02-26 ENCOUNTER — NURSE TRIAGE (OUTPATIENT)
Dept: ADMINISTRATIVE | Facility: CLINIC | Age: 22
End: 2022-02-26
Payer: MEDICAID

## 2022-02-26 NOTE — TELEPHONE ENCOUNTER
Pt calling is 9wk and 5 days pregnant with about 2 quarter sized vaginal discharge that looks like dark blood with dark brown. Reports it is now about a size of a dime. Denies any abd pain. Per protocol advised to be seen within 3 days. Informed her that if bleeding gets worse or abd pain occurs to call back or go to ED. Verbalized understanding. Also went over general recommendations for her and pt verbalized understanding. Unable to get appt for pt so will route message to OBGYN. Advised that if unable to be seen by Monday to please go to walk in OBGYN clinic at [facility]. Pt verbalized understanding. Will route message.     Reason for Disposition   MILD vaginal bleeding (i.e., less than 1 pad / hour; less than patient's usual menstrual bleeding; not just spotting)    Additional Information   Negative: Shock suspected (e.g., cold/pale/clammy skin, too weak to stand, low BP, rapid pulse)   Negative: Difficult to awaken or acting confused (e.g., disoriented, slurred speech)   Negative: Passed out (i.e., lost consciousness, collapsed and was not responding)   Negative: Sounds like a life-threatening emergency to the triager   Negative: SEVERE abdominal pain   Negative: [1] SEVERE vaginal bleeding (e.g., soaking 2 pads / hour, large blood clots) AND [2] present 2 or more hours   Negative: SEVERE dizziness (e.g., unable to stand, requires support to walk, feels like passing out)   Negative: [1] MODERATE vaginal bleeding (e.g., soaking 1 pad per hour; clots) AND [2] present > 6 hours   Negative: [1] MODERATE vaginal bleeding (e.g., soaking 1 pad per hour; clots) AND [2] pregnant > 12 weeks   Negative: Passed tissue (e.g., gray-white)   Negative: Shoulder pain   Negative: Pale skin (pallor) of new-onset or worsening   Negative: Patient sounds very sick or weak to the triager   Negative: [1] Constant abdominal pain AND [2] present > 2 hours   Negative: Fever > 100.4 F (38.0 C)   Negative: [1]  "Intermittent lower abdominal pain (e.g., cramping) AND [2] present > 24 hours   Negative: Prior history of "ectopic pregnancy" or previous tubal surgery (e.g., tubal ligation)   Negative: Pain or burning with passing urine (urination)   Negative: MODERATE vaginal bleeding (e.g., soaking 1 pad per hour; clots)   Negative: Has IUD    Protocols used: PREGNANCY - VAGINAL BLEEDING LESS THAN 20 WEEKS EGA-A-AH      "

## 2022-03-23 ENCOUNTER — ROUTINE PRENATAL (OUTPATIENT)
Dept: OBSTETRICS AND GYNECOLOGY | Facility: CLINIC | Age: 22
End: 2022-03-23
Payer: MEDICAID

## 2022-03-23 VITALS
SYSTOLIC BLOOD PRESSURE: 106 MMHG | DIASTOLIC BLOOD PRESSURE: 66 MMHG | BODY MASS INDEX: 31.22 KG/M2 | WEIGHT: 159.81 LBS

## 2022-03-23 DIAGNOSIS — Z3A.13 13 WEEKS GESTATION OF PREGNANCY: Primary | ICD-10-CM

## 2022-03-23 PROCEDURE — 99213 OFFICE O/P EST LOW 20 MIN: CPT | Mod: PBBFAC | Performed by: OBSTETRICS & GYNECOLOGY

## 2022-03-23 PROCEDURE — 99213 PR OFFICE/OUTPT VISIT, EST, LEVL III, 20-29 MIN: ICD-10-PCS | Mod: TH,S$PBB,, | Performed by: OBSTETRICS & GYNECOLOGY

## 2022-03-23 PROCEDURE — 87086 URINE CULTURE/COLONY COUNT: CPT | Performed by: OBSTETRICS & GYNECOLOGY

## 2022-03-23 PROCEDURE — 87591 N.GONORRHOEAE DNA AMP PROB: CPT | Performed by: OBSTETRICS & GYNECOLOGY

## 2022-03-23 PROCEDURE — 87491 CHLMYD TRACH DNA AMP PROBE: CPT | Performed by: OBSTETRICS & GYNECOLOGY

## 2022-03-23 PROCEDURE — 99999 PR PBB SHADOW E&M-EST. PATIENT-LVL III: ICD-10-PCS | Mod: PBBFAC,,, | Performed by: OBSTETRICS & GYNECOLOGY

## 2022-03-23 PROCEDURE — 99999 PR PBB SHADOW E&M-EST. PATIENT-LVL III: CPT | Mod: PBBFAC,,, | Performed by: OBSTETRICS & GYNECOLOGY

## 2022-03-23 PROCEDURE — 99213 OFFICE O/P EST LOW 20 MIN: CPT | Mod: TH,S$PBB,, | Performed by: OBSTETRICS & GYNECOLOGY

## 2022-03-23 NOTE — PROGRESS NOTES
13w3d here for OB visit.    Pregnancy complicated by:   x 1, declined TOLAC  Smokes tobacco    No major complaints today.  Denies vaginal bleeding, leakage of fluid, or contractions.    Initial OB lab results d/w pt.  Pt declined first trimester aneuploidy screening.    Encourage tobacco cessation  Discussed Connective MOM program.  Encourage Covid and flu vaccine.    Principles of prenatal care and precautions reviewed.  Return 4 wks or sooner prn.  All questions answered, voiced understanding.  Family present for visit.      Keaton Mcallister MD

## 2022-03-24 LAB
C TRACH DNA SPEC QL NAA+PROBE: NOT DETECTED
N GONORRHOEA DNA SPEC QL NAA+PROBE: NOT DETECTED

## 2022-03-25 LAB — BACTERIA UR CULT: NORMAL

## 2022-04-17 ENCOUNTER — NURSE TRIAGE (OUTPATIENT)
Dept: ADMINISTRATIVE | Facility: CLINIC | Age: 22
End: 2022-04-17
Payer: MEDICAID

## 2022-04-17 NOTE — TELEPHONE ENCOUNTER
OOC NT incoming call -  Pt reports nasal congestion and ear pain - Nasal allergies protocol followed and pt advised on home care. Pt does report she is 17 weeks pregnant and has questions regarding OTC meds. NT able to provide pt with web resource [facility] A-Z of pregnancy. With ok to use Loratadine. Encounter routed to Dr. DELMY Peterson    Reason for Disposition   [1] Nasal allergies AND [2] only certain times of year (hay fever)    Additional Information   Negative: [1] Wheezing (high pitched whistling sound) AND [2] previous asthma attacks or use of asthma medicines   Negative: [1] Wheezing (high pitched whistling sound) AND [2] no history of asthma   Negative: Eye redness and itching are the only symptoms   Negative: Doesn't match the SYMPTOMS for Hay Fever   Negative: Patient sounds very sick or weak to the triager   Negative: Lots of coughing   Negative: [1] Lots of yellow or green discharge from nose AND [2] present > 3 days   Negative: [1] Taking antihistamines > 2 days AND [2] nasal allergy symptoms interfere with sleep, school, or work   Negative: [1] Nasal allergies AND [2] only certain times of year AND [3] hay fever diagnosis has never been confirmed by a HCP   Negative: [1] Nasal allergies AND [2] year-round symptoms   Negative: Snores most nights of month    Protocols used: NASAL ALLERGIES (HAY FEVER)-A-

## 2022-04-20 ENCOUNTER — ROUTINE PRENATAL (OUTPATIENT)
Dept: OBSTETRICS AND GYNECOLOGY | Facility: CLINIC | Age: 22
End: 2022-04-20
Payer: MEDICAID

## 2022-04-20 ENCOUNTER — LAB VISIT (OUTPATIENT)
Dept: LAB | Facility: HOSPITAL | Age: 22
End: 2022-04-20
Attending: PEDIATRICS
Payer: MEDICAID

## 2022-04-20 VITALS — WEIGHT: 153 LBS | BODY MASS INDEX: 29.88 KG/M2 | SYSTOLIC BLOOD PRESSURE: 110 MMHG | DIASTOLIC BLOOD PRESSURE: 54 MMHG

## 2022-04-20 DIAGNOSIS — Z3A.17 17 WEEKS GESTATION OF PREGNANCY: ICD-10-CM

## 2022-04-20 DIAGNOSIS — Z3A.17 17 WEEKS GESTATION OF PREGNANCY: Primary | ICD-10-CM

## 2022-04-20 PROCEDURE — 99999 PR PBB SHADOW E&M-EST. PATIENT-LVL II: CPT | Mod: PBBFAC,,, | Performed by: OBSTETRICS & GYNECOLOGY

## 2022-04-20 PROCEDURE — 99213 PR OFFICE/OUTPT VISIT, EST, LEVL III, 20-29 MIN: ICD-10-PCS | Mod: TH,S$PBB,, | Performed by: OBSTETRICS & GYNECOLOGY

## 2022-04-20 PROCEDURE — 99213 OFFICE O/P EST LOW 20 MIN: CPT | Mod: TH,S$PBB,, | Performed by: OBSTETRICS & GYNECOLOGY

## 2022-04-20 PROCEDURE — 36415 COLL VENOUS BLD VENIPUNCTURE: CPT | Performed by: OBSTETRICS & GYNECOLOGY

## 2022-04-20 PROCEDURE — 81511 FTL CGEN ABNOR FOUR ANAL: CPT | Performed by: OBSTETRICS & GYNECOLOGY

## 2022-04-20 PROCEDURE — 99999 PR PBB SHADOW E&M-EST. PATIENT-LVL II: ICD-10-PCS | Mod: PBBFAC,,, | Performed by: OBSTETRICS & GYNECOLOGY

## 2022-04-20 PROCEDURE — 99212 OFFICE O/P EST SF 10 MIN: CPT | Mod: PBBFAC | Performed by: OBSTETRICS & GYNECOLOGY

## 2022-04-20 NOTE — PROGRESS NOTES
17w3d here for OB visit.    Pregnancy complicated by:   x 1, declined TOLAC  Smokes tobacco    Pt has no major complaints today.  Reports active fetus.  Denies vaginal bleeding, leakage of fluid, or contractions.    Discussed aneuploidy screening options at this gestational age.  Pt is interested in only quad screen, declined cfDNA or invasive testing/amnio.  Limitation of quad screen discussed.  Refer to [facility] for anatomy ultrasound.    Encourage tobacco cessation  Discussed Connective MOM program.  Encourage Covid and flu vaccine.    Principles of prenatal care and precautions reviewed.  Return 4 wks or sooner prn.  All questions answered, voiced understanding.  Pt mother present for visit.      Keaton Mcallister MD

## 2022-04-25 LAB
# FETUSES US: NORMAL
2ND TRIMESTER 4 SCREEN PNL SERPL: NEGATIVE
2ND TRIMESTER 4 SCREEN SERPL-IMP: NORMAL
AFP MOM SERPL: 0.75
AFP SERPL-MCNC: 30.9 NG/ML
AGE AT DELIVERY: 22
B-HCG MOM SERPL: 0.25
B-HCG SERPL-ACNC: 7 IU/ML
FET TS 21 RISK FROM MAT AGE: NORMAL
GA (DAYS): 3 D
GA (WEEKS): 17 WK
GA METHOD: NORMAL
IDDM PATIENT QL: NORMAL
INHIBIN A MOM SERPL: 1.07
INHIBIN A SERPL-MCNC: 155 PG/ML
SMOKING STATUS FTND: NORMAL
TS 18 RISK FETUS: NORMAL
TS 21 RISK FETUS: NORMAL
U ESTRIOL MOM SERPL: 0.82
U ESTRIOL SERPL-MCNC: 1.16 NG/ML

## 2022-05-16 ENCOUNTER — TELEPHONE (OUTPATIENT)
Dept: OBSTETRICS AND GYNECOLOGY | Facility: CLINIC | Age: 22
End: 2022-05-16
Payer: MEDICAID

## 2022-05-16 NOTE — TELEPHONE ENCOUNTER
Called pt. Pt advised to  her confirmation letter on her appt date.     ----- Message from Tatiana Wagner sent at 5/11/2022 11:39 AM CDT -----  Type: Patient Call Back    Who called:pt    What is the request in detail:pt lost her wicc paper she received and is asking for another one to be picked up today. Call pt    Can the clinic reply by [facility]?    Would the patient rather a call back or a response via [facility]? call    Best call back number:[phone] (home)       Additional Information:

## 2022-05-18 ENCOUNTER — ROUTINE PRENATAL (OUTPATIENT)
Dept: OBSTETRICS AND GYNECOLOGY | Facility: CLINIC | Age: 22
End: 2022-05-18
Payer: MEDICAID

## 2022-05-18 VITALS
SYSTOLIC BLOOD PRESSURE: 112 MMHG | BODY MASS INDEX: 30.68 KG/M2 | WEIGHT: 157.06 LBS | DIASTOLIC BLOOD PRESSURE: 54 MMHG

## 2022-05-18 DIAGNOSIS — Z3A.21 21 WEEKS GESTATION OF PREGNANCY: Primary | ICD-10-CM

## 2022-05-18 PROCEDURE — 99999 PR PBB SHADOW E&M-EST. PATIENT-LVL III: ICD-10-PCS | Mod: PBBFAC,,, | Performed by: OBSTETRICS & GYNECOLOGY

## 2022-05-18 PROCEDURE — 99999 PR PBB SHADOW E&M-EST. PATIENT-LVL III: CPT | Mod: PBBFAC,,, | Performed by: OBSTETRICS & GYNECOLOGY

## 2022-05-18 PROCEDURE — 99213 PR OFFICE/OUTPT VISIT, EST, LEVL III, 20-29 MIN: ICD-10-PCS | Mod: TH,S$PBB,, | Performed by: OBSTETRICS & GYNECOLOGY

## 2022-05-18 PROCEDURE — 99213 OFFICE O/P EST LOW 20 MIN: CPT | Mod: PBBFAC | Performed by: OBSTETRICS & GYNECOLOGY

## 2022-05-18 PROCEDURE — 99213 OFFICE O/P EST LOW 20 MIN: CPT | Mod: TH,S$PBB,, | Performed by: OBSTETRICS & GYNECOLOGY

## 2022-05-18 NOTE — PROGRESS NOTES
21w3d here for OB visit.    Pregnancy complicated by:   x 1, declined TOLAC  Smokes tobacco    No major complaints today.  Reports active fetus.  Denies vaginal bleeding, leakage of fluid, or contractions.    Refer to [facility] for anatomy ultrasound.  Quad screen negative.    Encourage tobacco cessation  Discussed Connective MOM program.  Encourage Covid and flu vaccine.    Labor/preE precautions.  Kick counts.  Return 4 wks or sooner prn.  All questions answered, voiced understanding.      Keaton Mcallister MD

## 2022-05-25 ENCOUNTER — PROCEDURE VISIT (OUTPATIENT)
Dept: MATERNAL FETAL MEDICINE | Facility: CLINIC | Age: 22
End: 2022-05-25
Payer: MEDICAID

## 2022-05-25 DIAGNOSIS — Z36.3 ANTENATAL SCREENING FOR MALFORMATION USING ULTRASONICS: ICD-10-CM

## 2022-05-25 DIAGNOSIS — Z36.4 ANTENATAL SCREENING FOR FETAL GROWTH RETARDATION USING ULTRASONICS: ICD-10-CM

## 2022-05-25 DIAGNOSIS — Z36.2 ENCOUNTER FOR FOLLOW-UP ULTRASOUND OF FETAL ANATOMY: Primary | ICD-10-CM

## 2022-05-25 DIAGNOSIS — Z3A.22 22 WEEKS GESTATION OF PREGNANCY: ICD-10-CM

## 2022-05-25 DIAGNOSIS — O09.32 INSUFFICIENT PRENATAL CARE IN SECOND TRIMESTER: ICD-10-CM

## 2022-05-25 DIAGNOSIS — Z3A.21 21 WEEKS GESTATION OF PREGNANCY: ICD-10-CM

## 2022-05-25 PROCEDURE — 76805 OB US >/= 14 WKS SNGL FETUS: CPT | Mod: PBBFAC,PO | Performed by: OBSTETRICS & GYNECOLOGY

## 2022-05-25 PROCEDURE — 76805 OB US >/= 14 WKS SNGL FETUS: CPT | Mod: 26,S$PBB,, | Performed by: OBSTETRICS & GYNECOLOGY

## 2022-05-25 PROCEDURE — 76805 PR US, OB 14+WKS, TRANSABD, SINGLE GESTATION: ICD-10-PCS | Mod: 26,S$PBB,, | Performed by: OBSTETRICS & GYNECOLOGY

## 2022-06-15 ENCOUNTER — LAB VISIT (OUTPATIENT)
Dept: LAB | Facility: HOSPITAL | Age: 22
End: 2022-06-15
Attending: OBSTETRICS & GYNECOLOGY
Payer: MEDICAID

## 2022-06-15 ENCOUNTER — ROUTINE PRENATAL (OUTPATIENT)
Dept: OBSTETRICS AND GYNECOLOGY | Facility: CLINIC | Age: 22
End: 2022-06-15
Payer: MEDICAID

## 2022-06-15 VITALS
WEIGHT: 159.81 LBS | SYSTOLIC BLOOD PRESSURE: 100 MMHG | DIASTOLIC BLOOD PRESSURE: 56 MMHG | BODY MASS INDEX: 31.22 KG/M2

## 2022-06-15 DIAGNOSIS — Z3A.25 25 WEEKS GESTATION OF PREGNANCY: ICD-10-CM

## 2022-06-15 DIAGNOSIS — Z3A.25 25 WEEKS GESTATION OF PREGNANCY: Primary | ICD-10-CM

## 2022-06-15 LAB
BASOPHILS # BLD AUTO: 0.03 K/UL (ref 0–0.2)
BASOPHILS NFR BLD: 0.3 % (ref 0–1.9)
DIFFERENTIAL METHOD: ABNORMAL
EOSINOPHIL # BLD AUTO: 0.2 K/UL (ref 0–0.5)
EOSINOPHIL NFR BLD: 2.5 % (ref 0–8)
ERYTHROCYTE [DISTWIDTH] IN BLOOD BY AUTOMATED COUNT: 12.3 % (ref 11.5–14.5)
GLUCOSE SERPL-MCNC: 141 MG/DL (ref 70–140)
HCT VFR BLD AUTO: 34 % (ref 37–48.5)
HGB BLD-MCNC: 11.9 G/DL (ref 12–16)
IMM GRANULOCYTES # BLD AUTO: 0.04 K/UL (ref 0–0.04)
IMM GRANULOCYTES NFR BLD AUTO: 0.4 % (ref 0–0.5)
LYMPHOCYTES # BLD AUTO: 2.1 K/UL (ref 1–4.8)
LYMPHOCYTES NFR BLD: 22.5 % (ref 18–48)
MCH RBC QN AUTO: 32.4 PG (ref 27–31)
MCHC RBC AUTO-ENTMCNC: 35 G/DL (ref 32–36)
MCV RBC AUTO: 93 FL (ref 82–98)
MONOCYTES # BLD AUTO: 0.6 K/UL (ref 0.3–1)
MONOCYTES NFR BLD: 6 % (ref 4–15)
NEUTROPHILS # BLD AUTO: 6.4 K/UL (ref 1.8–7.7)
NEUTROPHILS NFR BLD: 68.3 % (ref 38–73)
NRBC BLD-RTO: 0 /100 WBC
PLATELET # BLD AUTO: 233 K/UL (ref 150–450)
PMV BLD AUTO: 9.6 FL (ref 9.2–12.9)
RBC # BLD AUTO: 3.67 M/UL (ref 4–5.4)
WBC # BLD AUTO: 9.34 K/UL (ref 3.9–12.7)

## 2022-06-15 PROCEDURE — 99999 PR PBB SHADOW E&M-EST. PATIENT-LVL III: ICD-10-PCS | Mod: PBBFAC,,, | Performed by: OBSTETRICS & GYNECOLOGY

## 2022-06-15 PROCEDURE — 36415 COLL VENOUS BLD VENIPUNCTURE: CPT | Performed by: OBSTETRICS & GYNECOLOGY

## 2022-06-15 PROCEDURE — 82950 GLUCOSE TEST: CPT | Performed by: OBSTETRICS & GYNECOLOGY

## 2022-06-15 PROCEDURE — 99213 OFFICE O/P EST LOW 20 MIN: CPT | Mod: TH,S$PBB,, | Performed by: OBSTETRICS & GYNECOLOGY

## 2022-06-15 PROCEDURE — 99213 OFFICE O/P EST LOW 20 MIN: CPT | Mod: PBBFAC | Performed by: OBSTETRICS & GYNECOLOGY

## 2022-06-15 PROCEDURE — 99999 PR PBB SHADOW E&M-EST. PATIENT-LVL III: CPT | Mod: PBBFAC,,, | Performed by: OBSTETRICS & GYNECOLOGY

## 2022-06-15 PROCEDURE — 85025 COMPLETE CBC W/AUTO DIFF WBC: CPT | Performed by: OBSTETRICS & GYNECOLOGY

## 2022-06-15 PROCEDURE — 99213 PR OFFICE/OUTPT VISIT, EST, LEVL III, 20-29 MIN: ICD-10-PCS | Mod: TH,S$PBB,, | Performed by: OBSTETRICS & GYNECOLOGY

## 2022-06-15 NOTE — PROGRESS NOTES
25w3d here for OB visit.    Pregnancy complicated by:   x 1, declined TOLAC  Smokes tobacco    Pt has no major complaints today.  Reports active fetus.  Denies vaginal bleeding, leakage of fluid, or contractions.    Order 1 hr glucola, CBC.    Refer to [facility] for anatomy ultrasound.  Quad screen negative.    Encourage tobacco cessation  Discussed Connective MOM program.  Encourage Covid and flu vaccine.    Labor/preE precautions.  Kick counts.  Return 4 wks or sooner prn.  All questions answered, voiced understanding.      Keaton Mcallister MD

## 2022-06-17 ENCOUNTER — LAB VISIT (OUTPATIENT)
Dept: LAB | Facility: HOSPITAL | Age: 22
End: 2022-06-17
Attending: OBSTETRICS & GYNECOLOGY
Payer: MEDICAID

## 2022-06-17 DIAGNOSIS — Z3A.25 25 WEEKS GESTATION OF PREGNANCY: ICD-10-CM

## 2022-06-17 LAB
ESTIMATED AVG GLUCOSE: 82 MG/DL (ref 68–131)
GLUCOSE SERPL-MCNC: 116 MG/DL
GLUCOSE SERPL-MCNC: 85 MG/DL
GLUCOSE SERPL-MCNC: 86 MG/DL
GLUCOSE SERPL-MCNC: 86 MG/DL (ref 70–110)
HBA1C MFR BLD: 4.5 % (ref 4–5.6)

## 2022-06-17 PROCEDURE — 36415 COLL VENOUS BLD VENIPUNCTURE: CPT | Performed by: OBSTETRICS & GYNECOLOGY

## 2022-06-17 PROCEDURE — 83036 HEMOGLOBIN GLYCOSYLATED A1C: CPT | Performed by: OBSTETRICS & GYNECOLOGY

## 2022-06-17 PROCEDURE — 82951 GLUCOSE TOLERANCE TEST (GTT): CPT | Performed by: OBSTETRICS & GYNECOLOGY

## 2022-06-22 ENCOUNTER — PROCEDURE VISIT (OUTPATIENT)
Dept: MATERNAL FETAL MEDICINE | Facility: CLINIC | Age: 22
End: 2022-06-22
Payer: MEDICAID

## 2022-06-22 DIAGNOSIS — Z36.2 ENCOUNTER FOR FOLLOW-UP ULTRASOUND OF FETAL ANATOMY: ICD-10-CM

## 2022-06-22 PROCEDURE — 76816 US MFM PROCEDURE (VIEWPOINT): ICD-10-PCS | Mod: 26,S$PBB,, | Performed by: OBSTETRICS & GYNECOLOGY

## 2022-06-22 PROCEDURE — 76816 OB US FOLLOW-UP PER FETUS: CPT | Mod: PBBFAC,PO | Performed by: OBSTETRICS & GYNECOLOGY

## 2022-07-06 ENCOUNTER — ROUTINE PRENATAL (OUTPATIENT)
Dept: OBSTETRICS AND GYNECOLOGY | Facility: CLINIC | Age: 22
End: 2022-07-06
Payer: MEDICAID

## 2022-07-06 VITALS — SYSTOLIC BLOOD PRESSURE: 110 MMHG | DIASTOLIC BLOOD PRESSURE: 56 MMHG | BODY MASS INDEX: 30.57 KG/M2 | WEIGHT: 156.5 LBS

## 2022-07-06 DIAGNOSIS — Z3A.28 28 WEEKS GESTATION OF PREGNANCY: Primary | ICD-10-CM

## 2022-07-06 PROCEDURE — 99213 OFFICE O/P EST LOW 20 MIN: CPT | Mod: TH,S$PBB,, | Performed by: OBSTETRICS & GYNECOLOGY

## 2022-07-06 PROCEDURE — 99999 PR PBB SHADOW E&M-EST. PATIENT-LVL III: ICD-10-PCS | Mod: PBBFAC,,, | Performed by: OBSTETRICS & GYNECOLOGY

## 2022-07-06 PROCEDURE — 99213 OFFICE O/P EST LOW 20 MIN: CPT | Mod: PBBFAC | Performed by: OBSTETRICS & GYNECOLOGY

## 2022-07-06 PROCEDURE — 99999 PR PBB SHADOW E&M-EST. PATIENT-LVL III: CPT | Mod: PBBFAC,,, | Performed by: OBSTETRICS & GYNECOLOGY

## 2022-07-06 PROCEDURE — 99213 PR OFFICE/OUTPT VISIT, EST, LEVL III, 20-29 MIN: ICD-10-PCS | Mod: TH,S$PBB,, | Performed by: OBSTETRICS & GYNECOLOGY

## 2022-07-06 RX ORDER — PNV NO.95/FERROUS FUM/FOLIC AC 28MG-0.8MG
1 TABLET ORAL DAILY
Status: ON HOLD | COMMUNITY
Start: 2022-02-23 | End: 2022-09-01 | Stop reason: HOSPADM

## 2022-07-06 NOTE — PROGRESS NOTES
28w3d here for OB visit.    Pregnancy complicated by:   x 1, declined TOLAC  Smokes tobacco    No major complaints today.  Reports active fetus.  Denies vaginal bleeding, leakage of fluid, or contractions.    1 hr glucola 141.  3 hrs OGTT normal.    Pt seen M for follow up US on :    Impression   =========   1. Some of the anatomic structures were suboptimally imaged (see above), but no abnormalities are suspected. A follow-up anatomic ultrasound has not been scheduled.   Visualization of suboptimally imaged structures is unlikely to improve with advancing gestational age, and no abnormalities have been suspected on prior studies.   2. Fetal size is AGA with the EFW at the 59%.   3. AFV is normal.     Recommendation   ==============   Repeat ultrasound assessment as clinically indicated.     Encourage tobacco cessation  Discussed Connective MOM program.  Encourage Covid and flu vaccine.    Labor/preE precautions.  Kick counts.  Return 2 wks or sooner prn.  All questions answered, voiced understanding.      Keaton Mcallister MD

## 2022-07-20 ENCOUNTER — ROUTINE PRENATAL (OUTPATIENT)
Dept: OBSTETRICS AND GYNECOLOGY | Facility: CLINIC | Age: 22
End: 2022-07-20
Payer: MEDICAID

## 2022-07-20 ENCOUNTER — CLINICAL SUPPORT (OUTPATIENT)
Dept: OBSTETRICS AND GYNECOLOGY | Facility: CLINIC | Age: 22
End: 2022-07-20
Payer: MEDICAID

## 2022-07-20 VITALS
DIASTOLIC BLOOD PRESSURE: 60 MMHG | SYSTOLIC BLOOD PRESSURE: 110 MMHG | BODY MASS INDEX: 30.74 KG/M2 | WEIGHT: 157.44 LBS

## 2022-07-20 DIAGNOSIS — O34.219 H/O CESAREAN SECTION COMPLICATING PREGNANCY: ICD-10-CM

## 2022-07-20 DIAGNOSIS — Z3A.30 30 WEEKS GESTATION OF PREGNANCY: Primary | ICD-10-CM

## 2022-07-20 DIAGNOSIS — Z23 NEED FOR TDAP VACCINATION: Primary | ICD-10-CM

## 2022-07-20 PROCEDURE — 99213 OFFICE O/P EST LOW 20 MIN: CPT | Mod: PBBFAC,TH | Performed by: OBSTETRICS & GYNECOLOGY

## 2022-07-20 PROCEDURE — 99999 PR PBB SHADOW E&M-EST. PATIENT-LVL III: CPT | Mod: PBBFAC,,, | Performed by: OBSTETRICS & GYNECOLOGY

## 2022-07-20 PROCEDURE — 99213 PR OFFICE/OUTPT VISIT, EST, LEVL III, 20-29 MIN: ICD-10-PCS | Mod: TH,S$PBB,, | Performed by: OBSTETRICS & GYNECOLOGY

## 2022-07-20 PROCEDURE — 90715 TDAP VACCINE 7 YRS/> IM: CPT | Mod: PBBFAC

## 2022-07-20 PROCEDURE — 99213 OFFICE O/P EST LOW 20 MIN: CPT | Mod: TH,S$PBB,, | Performed by: OBSTETRICS & GYNECOLOGY

## 2022-07-20 PROCEDURE — 99999 PR PBB SHADOW E&M-EST. PATIENT-LVL III: ICD-10-PCS | Mod: PBBFAC,,, | Performed by: OBSTETRICS & GYNECOLOGY

## 2022-07-20 NOTE — PROGRESS NOTES
30w3d here for OB visit.    Pregnancy complicated by:   x 1, declined TOLAC  Smokes tobacco    Pt has no major complaints today.  Reports active fetus.  Denies vaginal bleeding, leakage of fluid, or contractions.    Risks and benefits of the tdap vaccine discussed and encouraged.   Risks, benefits, and alternatives to tdap discussed.    Encourage tobacco cessation  Discussed Connective MOM program.  Encourage Covid and flu vaccine.    Labor/preE precautions.  Kick counts.  Return 2 wks or sooner prn.  All questions answered, voiced understanding.  Significant other and sister present for visit.      Keaton Mcallister MD

## 2022-08-03 ENCOUNTER — ROUTINE PRENATAL (OUTPATIENT)
Dept: OBSTETRICS AND GYNECOLOGY | Facility: CLINIC | Age: 22
End: 2022-08-03
Payer: MEDICAID

## 2022-08-03 VITALS
BODY MASS INDEX: 30.83 KG/M2 | WEIGHT: 157.88 LBS | DIASTOLIC BLOOD PRESSURE: 56 MMHG | SYSTOLIC BLOOD PRESSURE: 102 MMHG

## 2022-08-03 DIAGNOSIS — Z3A.32 32 WEEKS GESTATION OF PREGNANCY: Primary | ICD-10-CM

## 2022-08-03 PROCEDURE — 99213 PR OFFICE/OUTPT VISIT, EST, LEVL III, 20-29 MIN: ICD-10-PCS | Mod: TH,S$PBB,, | Performed by: OBSTETRICS & GYNECOLOGY

## 2022-08-03 PROCEDURE — 99213 OFFICE O/P EST LOW 20 MIN: CPT | Mod: PBBFAC | Performed by: OBSTETRICS & GYNECOLOGY

## 2022-08-03 PROCEDURE — 99213 OFFICE O/P EST LOW 20 MIN: CPT | Mod: TH,S$PBB,, | Performed by: OBSTETRICS & GYNECOLOGY

## 2022-08-03 PROCEDURE — 99999 PR PBB SHADOW E&M-EST. PATIENT-LVL III: CPT | Mod: PBBFAC,,, | Performed by: OBSTETRICS & GYNECOLOGY

## 2022-08-03 PROCEDURE — 99999 PR PBB SHADOW E&M-EST. PATIENT-LVL III: ICD-10-PCS | Mod: PBBFAC,,, | Performed by: OBSTETRICS & GYNECOLOGY

## 2022-08-03 NOTE — PROGRESS NOTES
32w3d here for OB visit.    Pregnancy complicated by:   x 1, declined TOLAC  Smokes tobacco    No major complaints today.  Reports active fetus.  Denies vaginal bleeding, leakage of fluid, or contractions.    Encourage tobacco cessation  Discussed Connective MOM program.  Encourage Covid and flu vaccine.    Labor/preE precautions.  Kick counts.  Return 2 wks or sooner prn.  All questions answered, voiced understanding.      Keaton Mcallister MD

## 2022-08-17 ENCOUNTER — ROUTINE PRENATAL (OUTPATIENT)
Dept: OBSTETRICS AND GYNECOLOGY | Facility: CLINIC | Age: 22
End: 2022-08-17
Payer: MEDICAID

## 2022-08-17 VITALS — BODY MASS INDEX: 31.5 KG/M2 | WEIGHT: 161.25 LBS | DIASTOLIC BLOOD PRESSURE: 60 MMHG | SYSTOLIC BLOOD PRESSURE: 116 MMHG

## 2022-08-17 DIAGNOSIS — Z3A.34 34 WEEKS GESTATION OF PREGNANCY: Primary | ICD-10-CM

## 2022-08-17 PROCEDURE — 99213 PR OFFICE/OUTPT VISIT, EST, LEVL III, 20-29 MIN: ICD-10-PCS | Mod: TH,S$PBB,, | Performed by: OBSTETRICS & GYNECOLOGY

## 2022-08-17 PROCEDURE — 99999 PR PBB SHADOW E&M-EST. PATIENT-LVL III: CPT | Mod: PBBFAC,,, | Performed by: OBSTETRICS & GYNECOLOGY

## 2022-08-17 PROCEDURE — 99213 OFFICE O/P EST LOW 20 MIN: CPT | Mod: PBBFAC | Performed by: OBSTETRICS & GYNECOLOGY

## 2022-08-17 PROCEDURE — 99213 OFFICE O/P EST LOW 20 MIN: CPT | Mod: TH,S$PBB,, | Performed by: OBSTETRICS & GYNECOLOGY

## 2022-08-17 PROCEDURE — 99999 PR PBB SHADOW E&M-EST. PATIENT-LVL III: ICD-10-PCS | Mod: PBBFAC,,, | Performed by: OBSTETRICS & GYNECOLOGY

## 2022-08-17 NOTE — PROGRESS NOTES
34w3d here for OB visit.    Pregnancy complicated by:   x 1, declined TOLAC  Smokes tobacco    Pt has no major complaints today.  Reports active fetus.  Denies vaginal bleeding, leakage of fluid, or contractions.  Encourage tobacco cessation  Discussed Connective MOM program.  Encourage Covid and flu vaccine.  Labor/preE precautions.  Kick counts.  Return 2 wks or sooner prn.  All questions answered, voiced understanding.      Keaton Mcallister MD

## 2022-08-29 ENCOUNTER — NURSE TRIAGE (OUTPATIENT)
Dept: ADMINISTRATIVE | Facility: CLINIC | Age: 22
End: 2022-08-29
Payer: MEDICAID

## 2022-08-29 ENCOUNTER — ANESTHESIA (OUTPATIENT)
Dept: OBSTETRICS AND GYNECOLOGY | Facility: HOSPITAL | Age: 22
End: 2022-08-29
Payer: MEDICAID

## 2022-08-29 ENCOUNTER — ANESTHESIA EVENT (OUTPATIENT)
Dept: OBSTETRICS AND GYNECOLOGY | Facility: HOSPITAL | Age: 22
End: 2022-08-29
Payer: MEDICAID

## 2022-08-29 ENCOUNTER — HOSPITAL ENCOUNTER (INPATIENT)
Facility: HOSPITAL | Age: 22
LOS: 3 days | Discharge: HOME | End: 2022-09-01
Attending: OBSTETRICS & GYNECOLOGY | Admitting: OBSTETRICS & GYNECOLOGY
Payer: MEDICAID

## 2022-08-29 DIAGNOSIS — O42.90 RUPTURE OF MEMBRANES WITH DELAY OF DELIVERY: ICD-10-CM

## 2022-08-29 DIAGNOSIS — Z3A.38 38 WEEKS GESTATION OF PREGNANCY: ICD-10-CM

## 2022-08-29 PROBLEM — Z98.891 S/P CESAREAN SECTION: Status: ACTIVE | Noted: 2022-08-29

## 2022-08-29 PROBLEM — Z3A.36 36 WEEKS GESTATION OF PREGNANCY: Status: ACTIVE | Noted: 2022-08-29

## 2022-08-29 LAB
ABO + RH BLD: NORMAL
BASOPHILS # BLD AUTO: 0.06 K/UL (ref 0–0.2)
BASOPHILS NFR BLD: 0.5 % (ref 0–1.9)
BLD GP AB SCN CELLS X3 SERPL QL: NORMAL
DIFFERENTIAL METHOD: ABNORMAL
EOSINOPHIL # BLD AUTO: 0.3 K/UL (ref 0–0.5)
EOSINOPHIL NFR BLD: 2.3 % (ref 0–8)
ERYTHROCYTE [DISTWIDTH] IN BLOOD BY AUTOMATED COUNT: 13 % (ref 11.5–14.5)
HCT VFR BLD AUTO: 34.9 % (ref 37–48.5)
HGB BLD-MCNC: 12.4 G/DL (ref 12–16)
IMM GRANULOCYTES # BLD AUTO: 0.1 K/UL (ref 0–0.04)
IMM GRANULOCYTES NFR BLD AUTO: 0.8 % (ref 0–0.5)
LYMPHOCYTES # BLD AUTO: 2.7 K/UL (ref 1–4.8)
LYMPHOCYTES NFR BLD: 21.5 % (ref 18–48)
MCH RBC QN AUTO: 31.5 PG (ref 27–31)
MCHC RBC AUTO-ENTMCNC: 35.5 G/DL (ref 32–36)
MCV RBC AUTO: 89 FL (ref 82–98)
MONOCYTES # BLD AUTO: 1 K/UL (ref 0.3–1)
MONOCYTES NFR BLD: 7.9 % (ref 4–15)
NEUTROPHILS # BLD AUTO: 8.3 K/UL (ref 1.8–7.7)
NEUTROPHILS NFR BLD: 67 % (ref 38–73)
NRBC BLD-RTO: 0 /100 WBC
PLATELET # BLD AUTO: 274 K/UL (ref 150–450)
PMV BLD AUTO: 9.6 FL (ref 9.2–12.9)
RBC # BLD AUTO: 3.94 M/UL (ref 4–5.4)
RUPTURE OF MEMBRANE: POSITIVE
SARS-COV-2 RDRP RESP QL NAA+PROBE: NEGATIVE
WBC # BLD AUTO: 12.34 K/UL (ref 3.9–12.7)

## 2022-08-29 PROCEDURE — 63600175 PHARM REV CODE 636 W HCPCS: Performed by: NURSE ANESTHETIST, CERTIFIED REGISTERED

## 2022-08-29 PROCEDURE — 59514 CESAREAN DELIVERY ONLY: CPT | Mod: QX,CRNA,, | Performed by: NURSE ANESTHETIST, CERTIFIED REGISTERED

## 2022-08-29 PROCEDURE — 59514 CESAREAN DELIVERY ONLY: CPT | Mod: 80,AT,, | Performed by: OBSTETRICS & GYNECOLOGY

## 2022-08-29 PROCEDURE — 63600175 PHARM REV CODE 636 W HCPCS: Performed by: OBSTETRICS & GYNECOLOGY

## 2022-08-29 PROCEDURE — 37000009 HC ANESTHESIA EA ADD 15 MINS: Performed by: OBSTETRICS & GYNECOLOGY

## 2022-08-29 PROCEDURE — 59514 CESAREAN DELIVERY ONLY: CPT | Mod: QY,ANES,, | Performed by: ANESTHESIOLOGY

## 2022-08-29 PROCEDURE — 85025 COMPLETE CBC W/AUTO DIFF WBC: CPT | Performed by: OBSTETRICS & GYNECOLOGY

## 2022-08-29 PROCEDURE — 59514 PRA REAN DELIVERY ONLY: ICD-10-PCS | Mod: QX,CRNA,, | Performed by: NURSE ANESTHETIST, CERTIFIED REGISTERED

## 2022-08-29 PROCEDURE — 37000008 HC ANESTHESIA 1ST 15 MINUTES: Performed by: OBSTETRICS & GYNECOLOGY

## 2022-08-29 PROCEDURE — 99211 OFF/OP EST MAY X REQ PHY/QHP: CPT | Mod: 25

## 2022-08-29 PROCEDURE — 71000033 HC RECOVERY, INTIAL HOUR: Performed by: OBSTETRICS & GYNECOLOGY

## 2022-08-29 PROCEDURE — 59514 PR CESAREAN DELIVERY ONLY: ICD-10-PCS | Mod: AT,,, | Performed by: OBSTETRICS & GYNECOLOGY

## 2022-08-29 PROCEDURE — 59514 CESAREAN DELIVERY ONLY: CPT | Mod: AT,,, | Performed by: OBSTETRICS & GYNECOLOGY

## 2022-08-29 PROCEDURE — 84112 EVAL AMNIOTIC FLUID PROTEIN: CPT | Performed by: OBSTETRICS & GYNECOLOGY

## 2022-08-29 PROCEDURE — U0002 COVID-19 LAB TEST NON-CDC: HCPCS | Performed by: OBSTETRICS & GYNECOLOGY

## 2022-08-29 PROCEDURE — 86850 RBC ANTIBODY SCREEN: CPT | Performed by: OBSTETRICS & GYNECOLOGY

## 2022-08-29 PROCEDURE — 36004724 HC OB OR TIME LEV III - 1ST 15 MIN: Performed by: OBSTETRICS & GYNECOLOGY

## 2022-08-29 PROCEDURE — 25000003 PHARM REV CODE 250: Performed by: ANESTHESIOLOGY

## 2022-08-29 PROCEDURE — 59514 PR CESAREAN DELIVERY ONLY: ICD-10-PCS | Mod: 80,AT,, | Performed by: OBSTETRICS & GYNECOLOGY

## 2022-08-29 PROCEDURE — 36004725 HC OB OR TIME LEV III - EA ADD 15 MIN: Performed by: OBSTETRICS & GYNECOLOGY

## 2022-08-29 PROCEDURE — 25000003 PHARM REV CODE 250: Performed by: OBSTETRICS & GYNECOLOGY

## 2022-08-29 PROCEDURE — 59025 FETAL NON-STRESS TEST: CPT

## 2022-08-29 PROCEDURE — 59514 PRA REAN DELIVERY ONLY: ICD-10-PCS | Mod: QY,ANES,, | Performed by: ANESTHESIOLOGY

## 2022-08-29 PROCEDURE — 86592 SYPHILIS TEST NON-TREP QUAL: CPT | Performed by: OBSTETRICS & GYNECOLOGY

## 2022-08-29 PROCEDURE — 11000001 HC ACUTE MED/SURG PRIVATE ROOM

## 2022-08-29 PROCEDURE — 71000039 HC RECOVERY, EACH ADD'L HOUR: Performed by: OBSTETRICS & GYNECOLOGY

## 2022-08-29 RX ORDER — ONDANSETRON 8 MG/1
8 TABLET, ORALLY DISINTEGRATING ORAL EVERY 8 HOURS PRN
Status: DISCONTINUED | OUTPATIENT
Start: 2022-08-29 | End: 2022-09-01 | Stop reason: HOSPADM

## 2022-08-29 RX ORDER — TERBUTALINE SULFATE 1 MG/ML
0.25 INJECTION SUBCUTANEOUS
Status: DISCONTINUED | OUTPATIENT
Start: 2022-08-29 | End: 2022-08-29

## 2022-08-29 RX ORDER — SODIUM CHLORIDE 9 MG/ML
INJECTION, SOLUTION INTRAVENOUS
Status: DISCONTINUED | OUTPATIENT
Start: 2022-08-29 | End: 2022-08-29

## 2022-08-29 RX ORDER — OXYTOCIN/RINGER'S LACTATE 30/500 ML
95 PLASTIC BAG, INJECTION (ML) INTRAVENOUS ONCE
Status: DISCONTINUED | OUTPATIENT
Start: 2022-08-29 | End: 2022-08-29

## 2022-08-29 RX ORDER — DIPHENHYDRAMINE HCL 25 MG
25 CAPSULE ORAL EVERY 4 HOURS PRN
Status: DISCONTINUED | OUTPATIENT
Start: 2022-08-29 | End: 2022-09-01 | Stop reason: HOSPADM

## 2022-08-29 RX ORDER — FAMOTIDINE 10 MG/ML
20 INJECTION INTRAVENOUS ONCE
Status: COMPLETED | OUTPATIENT
Start: 2022-08-29 | End: 2022-08-29

## 2022-08-29 RX ORDER — MISOPROSTOL 200 UG/1
600 TABLET ORAL
Status: DISCONTINUED | OUTPATIENT
Start: 2022-08-29 | End: 2022-08-29

## 2022-08-29 RX ORDER — PRENATAL WITH FERROUS FUM AND FOLIC ACID 3080; 920; 120; 400; 22; 1.84; 3; 20; 10; 1; 12; 200; 27; 25; 2 [IU]/1; [IU]/1; MG/1; [IU]/1; MG/1; MG/1; MG/1; MG/1; MG/1; MG/1; UG/1; MG/1; MG/1; MG/1; MG/1
1 TABLET ORAL DAILY
Status: DISCONTINUED | OUTPATIENT
Start: 2022-08-29 | End: 2022-09-01 | Stop reason: HOSPADM

## 2022-08-29 RX ORDER — SODIUM CHLORIDE, SODIUM LACTATE, POTASSIUM CHLORIDE, CALCIUM CHLORIDE 600; 310; 30; 20 MG/100ML; MG/100ML; MG/100ML; MG/100ML
INJECTION, SOLUTION INTRAVENOUS CONTINUOUS
Status: DISCONTINUED | OUTPATIENT
Start: 2022-08-29 | End: 2022-08-29

## 2022-08-29 RX ORDER — PROCHLORPERAZINE EDISYLATE 5 MG/ML
5 INJECTION INTRAMUSCULAR; INTRAVENOUS EVERY 6 HOURS PRN
Status: DISCONTINUED | OUTPATIENT
Start: 2022-08-29 | End: 2022-09-01 | Stop reason: HOSPADM

## 2022-08-29 RX ORDER — OXYTOCIN 10 [USP'U]/ML
INJECTION, SOLUTION INTRAMUSCULAR; INTRAVENOUS
Status: DISCONTINUED | OUTPATIENT
Start: 2022-08-29 | End: 2022-08-29

## 2022-08-29 RX ORDER — SIMETHICONE 80 MG
1 TABLET,CHEWABLE ORAL EVERY 6 HOURS PRN
Status: DISCONTINUED | OUTPATIENT
Start: 2022-08-29 | End: 2022-09-01 | Stop reason: HOSPADM

## 2022-08-29 RX ORDER — OXYCODONE AND ACETAMINOPHEN 5; 325 MG/1; MG/1
1 TABLET ORAL EVERY 4 HOURS PRN
Status: DISCONTINUED | OUTPATIENT
Start: 2022-08-29 | End: 2022-09-01 | Stop reason: HOSPADM

## 2022-08-29 RX ORDER — FENTANYL CITRATE 50 UG/ML
INJECTION, SOLUTION INTRAMUSCULAR; INTRAVENOUS
Status: DISCONTINUED | OUTPATIENT
Start: 2022-08-29 | End: 2022-08-29

## 2022-08-29 RX ORDER — BUTORPHANOL TARTRATE 1 MG/ML
2 INJECTION INTRAMUSCULAR; INTRAVENOUS
Status: DISCONTINUED | OUTPATIENT
Start: 2022-08-29 | End: 2022-08-29

## 2022-08-29 RX ORDER — PHENYLEPHRINE HYDROCHLORIDE 10 MG/ML
INJECTION INTRAVENOUS
Status: DISCONTINUED | OUTPATIENT
Start: 2022-08-29 | End: 2022-08-29

## 2022-08-29 RX ORDER — OXYTOCIN/RINGER'S LACTATE 30/500 ML
95 PLASTIC BAG, INJECTION (ML) INTRAVENOUS ONCE
Status: DISCONTINUED | OUTPATIENT
Start: 2022-08-29 | End: 2022-09-01 | Stop reason: HOSPADM

## 2022-08-29 RX ORDER — ONDANSETRON 8 MG/1
8 TABLET, ORALLY DISINTEGRATING ORAL EVERY 8 HOURS PRN
Status: DISCONTINUED | OUTPATIENT
Start: 2022-08-29 | End: 2022-08-29

## 2022-08-29 RX ORDER — IBUPROFEN 400 MG/1
800 TABLET ORAL EVERY 8 HOURS
Status: DISCONTINUED | OUTPATIENT
Start: 2022-08-30 | End: 2022-09-01 | Stop reason: HOSPADM

## 2022-08-29 RX ORDER — OXYTOCIN/RINGER'S LACTATE 30/500 ML
334 PLASTIC BAG, INJECTION (ML) INTRAVENOUS ONCE
Status: DISCONTINUED | OUTPATIENT
Start: 2022-08-29 | End: 2022-08-29

## 2022-08-29 RX ORDER — BISACODYL 10 MG
10 SUPPOSITORY, RECTAL RECTAL ONCE AS NEEDED
Status: DISCONTINUED | OUTPATIENT
Start: 2022-08-29 | End: 2022-09-01 | Stop reason: HOSPADM

## 2022-08-29 RX ORDER — SODIUM CHLORIDE 0.9 % (FLUSH) 0.9 %
10 SYRINGE (ML) INJECTION
Status: DISCONTINUED | OUTPATIENT
Start: 2022-08-29 | End: 2022-09-01 | Stop reason: HOSPADM

## 2022-08-29 RX ORDER — SODIUM CITRATE AND CITRIC ACID MONOHYDRATE 334; 500 MG/5ML; MG/5ML
30 SOLUTION ORAL ONCE
Status: COMPLETED | OUTPATIENT
Start: 2022-08-29 | End: 2022-08-29

## 2022-08-29 RX ORDER — OXYCODONE AND ACETAMINOPHEN 10; 325 MG/1; MG/1
1 TABLET ORAL EVERY 4 HOURS PRN
Status: DISCONTINUED | OUTPATIENT
Start: 2022-08-29 | End: 2022-09-01 | Stop reason: HOSPADM

## 2022-08-29 RX ORDER — MORPHINE SULFATE 1 MG/ML
INJECTION, SOLUTION EPIDURAL; INTRATHECAL; INTRAVENOUS
Status: DISCONTINUED | OUTPATIENT
Start: 2022-08-29 | End: 2022-08-29

## 2022-08-29 RX ORDER — ADHESIVE BANDAGE
30 BANDAGE TOPICAL 2 TIMES DAILY PRN
Status: DISCONTINUED | OUTPATIENT
Start: 2022-08-30 | End: 2022-09-01 | Stop reason: HOSPADM

## 2022-08-29 RX ORDER — OXYTOCIN/RINGER'S LACTATE 30/500 ML
0-30 PLASTIC BAG, INJECTION (ML) INTRAVENOUS CONTINUOUS
Status: DISCONTINUED | OUTPATIENT
Start: 2022-08-29 | End: 2022-08-29

## 2022-08-29 RX ORDER — MUPIROCIN 20 MG/G
OINTMENT TOPICAL 2 TIMES DAILY
Status: DISCONTINUED | OUTPATIENT
Start: 2022-08-29 | End: 2022-09-01 | Stop reason: HOSPADM

## 2022-08-29 RX ORDER — AMOXICILLIN 250 MG
1 CAPSULE ORAL NIGHTLY PRN
Status: DISCONTINUED | OUTPATIENT
Start: 2022-08-29 | End: 2022-09-01 | Stop reason: HOSPADM

## 2022-08-29 RX ORDER — SODIUM CITRATE AND CITRIC ACID MONOHYDRATE 334; 500 MG/5ML; MG/5ML
30 SOLUTION ORAL ONCE
Status: CANCELLED | OUTPATIENT
Start: 2022-08-29 | End: 2022-08-29

## 2022-08-29 RX ORDER — ACETAMINOPHEN 10 MG/ML
INJECTION, SOLUTION INTRAVENOUS
Status: DISCONTINUED | OUTPATIENT
Start: 2022-08-29 | End: 2022-08-29

## 2022-08-29 RX ORDER — KETOROLAC TROMETHAMINE 30 MG/ML
30 INJECTION, SOLUTION INTRAMUSCULAR; INTRAVENOUS EVERY 8 HOURS
Status: COMPLETED | OUTPATIENT
Start: 2022-08-29 | End: 2022-08-30

## 2022-08-29 RX ORDER — DOCUSATE SODIUM 100 MG/1
200 CAPSULE, LIQUID FILLED ORAL 2 TIMES DAILY
Status: DISCONTINUED | OUTPATIENT
Start: 2022-08-29 | End: 2022-09-01 | Stop reason: HOSPADM

## 2022-08-29 RX ORDER — CEFAZOLIN SODIUM 2 G/50ML
2 SOLUTION INTRAVENOUS ONCE
Status: COMPLETED | OUTPATIENT
Start: 2022-08-29 | End: 2022-08-29

## 2022-08-29 RX ORDER — OXYTOCIN/RINGER'S LACTATE 30/500 ML
PLASTIC BAG, INJECTION (ML) INTRAVENOUS CONTINUOUS PRN
Status: DISCONTINUED | OUTPATIENT
Start: 2022-08-29 | End: 2022-08-29

## 2022-08-29 RX ORDER — BUTORPHANOL TARTRATE 1 MG/ML
1 INJECTION INTRAMUSCULAR; INTRAVENOUS
Status: DISCONTINUED | OUTPATIENT
Start: 2022-08-29 | End: 2022-08-29

## 2022-08-29 RX ADMIN — OXYCODONE AND ACETAMINOPHEN 1 TABLET: 10; 325 TABLET ORAL at 10:08

## 2022-08-29 RX ADMIN — PHENYLEPHRINE HYDROCHLORIDE 200 MCG: 10 INJECTION INTRAVENOUS at 04:08

## 2022-08-29 RX ADMIN — PHENYLEPHRINE HYDROCHLORIDE 200 MCG: 10 INJECTION INTRAVENOUS at 05:08

## 2022-08-29 RX ADMIN — SODIUM CHLORIDE, SODIUM LACTATE, POTASSIUM CHLORIDE, AND CALCIUM CHLORIDE: .6; .31; .03; .02 INJECTION, SOLUTION INTRAVENOUS at 03:08

## 2022-08-29 RX ADMIN — FAMOTIDINE 20 MG: 10 INJECTION INTRAVENOUS at 04:08

## 2022-08-29 RX ADMIN — FENTANYL CITRATE 50 MCG: 50 INJECTION, SOLUTION INTRAMUSCULAR; INTRAVENOUS at 05:08

## 2022-08-29 RX ADMIN — MUPIROCIN: 20 OINTMENT TOPICAL at 09:08

## 2022-08-29 RX ADMIN — MUPIROCIN: 20 OINTMENT TOPICAL at 10:08

## 2022-08-29 RX ADMIN — CEFAZOLIN SODIUM 2 G: 2 SOLUTION INTRAVENOUS at 04:08

## 2022-08-29 RX ADMIN — SODIUM CHLORIDE, SODIUM LACTATE, POTASSIUM CHLORIDE, AND CALCIUM CHLORIDE: .6; .31; .03; .02 INJECTION, SOLUTION INTRAVENOUS at 05:08

## 2022-08-29 RX ADMIN — Medication 0.1 MG: at 04:08

## 2022-08-29 RX ADMIN — PHENYLEPHRINE HYDROCHLORIDE 100 MCG: 10 INJECTION INTRAVENOUS at 04:08

## 2022-08-29 RX ADMIN — KETOROLAC TROMETHAMINE 30 MG: 30 INJECTION, SOLUTION INTRAMUSCULAR at 02:08

## 2022-08-29 RX ADMIN — Medication 334 ML/HR: at 05:08

## 2022-08-29 RX ADMIN — ACETAMINOPHEN 1000 MG: 10 INJECTION, SOLUTION INTRAVENOUS at 05:08

## 2022-08-29 RX ADMIN — SODIUM CHLORIDE, SODIUM LACTATE, POTASSIUM CHLORIDE, AND CALCIUM CHLORIDE: .6; .31; .03; .02 INJECTION, SOLUTION INTRAVENOUS at 07:08

## 2022-08-29 RX ADMIN — OXYCODONE HYDROCHLORIDE AND ACETAMINOPHEN 1 TABLET: 5; 325 TABLET ORAL at 03:08

## 2022-08-29 RX ADMIN — SODIUM CITRATE AND CITRIC ACID MONOHYDRATE 30 ML: 500; 334 SOLUTION ORAL at 04:08

## 2022-08-29 RX ADMIN — SODIUM CHLORIDE, SODIUM LACTATE, POTASSIUM CHLORIDE, AND CALCIUM CHLORIDE 1000 ML: .6; .31; .03; .02 INJECTION, SOLUTION INTRAVENOUS at 03:08

## 2022-08-29 RX ADMIN — OXYTOCIN 30 UNITS: 10 INJECTION, SOLUTION INTRAMUSCULAR; INTRAVENOUS at 05:08

## 2022-08-29 RX ADMIN — PRENATAL VIT W/ FE FUMARATE-FA TAB 27-0.8 MG 1 TABLET: 27-0.8 TAB at 10:08

## 2022-08-29 RX ADMIN — FENTANYL CITRATE 10 MCG: 50 INJECTION, SOLUTION INTRAMUSCULAR; INTRAVENOUS at 04:08

## 2022-08-29 RX ADMIN — FENTANYL CITRATE 40 MCG: 50 INJECTION, SOLUTION INTRAMUSCULAR; INTRAVENOUS at 05:08

## 2022-08-29 RX ADMIN — AZITHROMYCIN MONOHYDRATE 500 MG: 500 INJECTION, POWDER, LYOPHILIZED, FOR SOLUTION INTRAVENOUS at 04:08

## 2022-08-29 NOTE — TELEPHONE ENCOUNTER
"Patient states she is 36 weeks pregnant today. She says she is leaking liquid and looks like she lost her mucus plug but denies any pain. Patient will go to the closest ER to her. Please contact caller directly to discuss any further care advice.    Reason for Disposition   Leakage of fluid from vagina    Additional Information   Negative: Passed out (i.e., lost consciousness, collapsed and was not responding)   Negative: Shock suspected (e.g., cold/pale/clammy skin, too weak to stand, low BP, rapid pulse)   Negative: Difficult to awaken or acting confused (e.g., disoriented, slurred speech)   Negative: [1] SEVERE abdominal pain (e.g., excruciating) AND [2] constant AND [3] present > 1 hour   Negative: Severe bleeding (e.g., continuous red blood from vagina, or large blood clots)   Negative: Umbilical cord hanging out of the vagina (shiny, white, curled appearance, "like telephone cord")   Negative: Uncontrollable urge to push (i.e., feels like baby is coming out now)   Negative: Can see baby   Negative: Sounds like a life-threatening emergency to the triager   Negative: MODERATE-SEVERE abdominal pain   Negative: Contractions < 10 minutes apart for 1 hour (i.e., 6 or more contractions an hour)   Negative: [1] Contractions > 10 minutes apart AND [2] persist > 24 hours AND [3] no improvement using CARE ADVICE   Negative: [1] Contractions AND [2] any vaginal bleeding (including: red blood, clots, spotting, or pink/brown mucous)   Negative: Vaginal bleeding  (Exception: brief spotting after intercourse or pelvic exam, or slight pinkish or brownish mucous discharge)    Protocols used: Pregnancy - Labor - [id number]    "

## 2022-08-29 NOTE — LACTATION NOTE
Breast Feeding Guide booklet given, discussed breast feeding basics,discussed feeding cues, feeding 8-10 times in 24 hours, hand expression, skin to skin, collection and storage of breast milk, wet and dirty diapers, prevention of engorgement, and recording feeding and care in booklet.verbalized understanding of suggestions, states breast fed first daughter for 6 months. Family at bedside and supportive.

## 2022-08-29 NOTE — NURSING
Dr. Mcallister called. Report given. MD notified of ctx pattern, positive ROM+, previous c/s. MD orders to prep pt for c/s for 0430 and notify anesthesia. Orders received

## 2022-08-29 NOTE — L&D DELIVERY NOTE
[facility]   Operative Report  Obstetrics & Gynecology      Date of Surgery:  2022     Surgeon:  Keaton Mcallister MD     Assistant:  Soledad Stubbs MD     Preoperative diagnosis:     Baca IUP at 36w1d  PPROM  H/o  delivery x 1  Declined trial of labor after      Postoperative diagnosis:     Baca IUP at 36w1d s/p repeat  section   Live infant  PPROM  H/o  delivery x 1  Declined trial of labor after     Procedure performed:  Repeat low transverse  section via pfannenstiel skin incision     Anesthesia:  Spinal      IV Fluids:  1000 mL of LR     Urine Output:  500 mL, clear at end of procedure       Quantitative Blood Loss:  175 mL with no replacements     Specimens:  Cord blood    Findings:      Live male infant, APGAR 1 min: 8, 5 min: 9, transfer to NICU for extended transition  Weight 2430 grams  PPROM 2022 ~130AM  Fluid at time of uterine incision with moderate, clear fluid, no odor  Grossly normal uterus, tubes and ovaries     Complications:  No immediate complications    Indications for the Procedure:      See H&P for complete details.    Kaye Long is a 22 y.o.  at 36w1d who presents to L&D with spontaneous rupture of membranes at home ~1:30AM 2022.  Pt was grossly ruptured on arrival, clear fluid, ROM test positive.  Pt desires repeat  delivery to avoid the risk of uterine rupture.  Pt reports occasional mild contraction after rupture of membranes.    Otherwise, pt has no major complaints this morning and denies vaginal bleeding and notes active fetal movement.    Pt antepartum course has been overall uneventful.     Pregnancy complicated by:   x 1, declined TOLAC  Smokes tobacco    Pt was informed of the risks, benefits, alternatives and possible complications to  and blood transfusion including pre-admission, admission, and post-admission procedures and expectations.  Risks of   section included, but were not limited to, death, urinary and/or fecal incontinence, injury to bladder and/or urinary tract, injury to bowel and/or intestinal obstruction, risk of infection, damage to major blood vessels, hemorrhage requiring transfusion of blood products and/or hysterectomy, painful intercourse, ovarian failure requiring hormone administration, pulmonary embolism, fistula formation, sexual dysfunction, hernia, failure of wound to heal, permanent and disfiguring scarring.  In the event of a hysterectomy +/- bilateral salpingo-oophorectomy (BS&O) if uterine/ovarian injury or uncontrollable hemorrhage were to occur, the patient was counseled extensively on the inability to become pregnant following a hysterectomy and in the event of a BS&O will result in surgical menopause.  All of the patients questions were answered to her satisfaction.  Informed consent was obtained for  delivery, blood transfusions and all indicated procedures.     Description of the Procedure:      The patient was taken to the operating room where a time out was performed to confirm the correct patient and correct procedure.  Spinal anesthesia was obtained without difficulty.  Pt was then prepped and draped in a normal sterile fashion in the dorsal supine position with a leftward tilt.  A Pfannenstiel skin incision was then made with the scalpel and carried through to the underlying layer of fascia with the Bovie.  The fascia was then incised in the midline and the incision extended laterally with Cardenas scissors.  The superior aspect of the fascia was then grasped with Kocher clamps, elevated and the underlying rectus muscles were dissected off bluntly.  Attention was then turned to the inferior aspect of the fascial incision which, in a similar fashion, was grasped, tented up with Kocher clamps, and the rectus muscles were dissected off bluntly.  The rectus muscles were then  in the midline, and the  peritoneum identified, tented up, and entered sharply with Metzenbaum scissors.  The peritoneal incision was then extended superiorly and inferiorly with good visualization of the bladder.  A low transverse uterine incision was made well above the bladder reflection with the scalpel.  The uterine incision was extended cephalo-caudad fashion and the infant's head delivered atraumatically followed by the remainder of the body without difficulty.  The mouth and nose were suctioned and the cord clamped and cut.  The infant was vigorous with a strong cry and handed to the awaiting NICU NNP.  Cord blood was obtained.  The placenta was removed spontaneously; the uterus was exteriorized and cleared of all clots and debris.  The uterine incision was repaired with 1-0 Monocryl in a running, locked fashion.  A second layer of the same suture was used to obtain excellent hemostasis.  The uterus was returned to the abdomen. The gutters were cleaned of all clots and debris.  Hemostasis was noted.  The rectus muscles were reapproximated with 2-0 chromic.  The fascia was reapproximated with 0 vicryl in a running fashion.  The skin was closed with absorbable Insorb staples.  Aquacel bandage dressing was applied to incision.  The patient tolerated the procedure well.  Sponge, lap and needle counts were correct time two.  Surgical prophylactic antibiotic was given prior to the procedure.  The patient was transferred to the L&D recovery room in stable condition.  Findings and expectations were discussed with patient.  All of her questions were answered to her satisfaction, patient voiced understanding.       Keaton Mcallister MD

## 2022-08-29 NOTE — NURSING
Pt arrived to unit via wheelchair by transport. Pt smiling, does not appear to be in acute distress. Pt states that she woke up at approx 0130 w/ the urge to urinate and had clear watery fluid leaking from her vagina; pt states she also lost her mucous plug. Pt denies vaginal bleeding. Pt endorses 4/10 intermittent abd pain that starts at the top of her abd and moves to her lower abd. Pt endorses +FM. Moderate ctx palpated. Abd soft at rest.   H/P obtained, c/s x1   Pt last ate/drank at approx 7pm or 8pm  Cont EFM initiated  ROM+ collected  SVE performed 0.5/60/-4; small amount of clear fluid and mucous noted on glove.

## 2022-08-29 NOTE — ANESTHESIA PREPROCEDURE EVALUATION
08/29/2022  Kaye Long is a 22 y.o., female.      Pre-op Assessment     I have reviewed the Nursing Notes.       Review of Systems  Social:  Former Smoker, Non-Smoker    Cardiovascular:  Cardiovascular Normal Exercise tolerance: good     Pulmonary:  Pulmonary Normal    Renal/:  Renal/ Normal     Hepatic/GI:   No Bowel Prep. Denies PUD. Denies Liver Disease. Denies Hepatitis.    Neurological:  Neurology Normal    Endocrine:  Endocrine Normal        Physical Exam  General: Well nourished, Cooperative, Alert and Oriented    Airway:  Mallampati: II   Mouth Opening: Normal  TM Distance: Normal  Tongue: Normal  Neck ROM: Normal ROM    Dental:  Intact        Anesthesia Plan  Type of Anesthesia, risks & benefits discussed:    Anesthesia Type: Spinal  Intra-op Monitoring Plan: Standard ASA Monitors  Post Op Pain Control Plan: multimodal analgesia  Informed Consent: Informed consent signed with the Patient and all parties understand the risks and agree with anesthesia plan.  All questions answered.   ASA Score: 2  Day of Surgery Review of History & Physical: H&P Update referred to the surgeon/provider.    Ready For Surgery From Anesthesia Perspective.     .

## 2022-08-29 NOTE — LACTATION NOTE
This note was copied from a baby's chart.     08/29/22 0800   Breastfeeding Session   Breastfeeding Left Side (min) 24 Min   LATCH Score   Latch 2-->grasps breast, tongue down, lips flanged, rhythmic sucking   Audible Swallowing 2-->spontaneous and intermittent (24 hrs old)   Type of Nipple 2-->everted (after stimulation)   Comfort (Breast/Nipple) 2-->soft/nontender   Hold (Positioning) 2-->no assist from staff, mother able to position/hold infant   Score 10

## 2022-08-29 NOTE — H&P
[facility]    Obstetrics & Gynecology  History & Physical      Patient Name:  Kaye Cazares  MRN:  [id number]  Admission Date:  2022  Hospital Length of Stay:  0  Attending Physician:  Keaton Mcallister MD  Primary Care Provider:  Marita Norris MD    Date:  2022     Chief Complaint:  Repeat  section    History of Present Illness:      Kaye Cazares is a 22 y.o.  at 36w1d who presents to L&D with spontaneous rupture of membranes at home ~1:30AM 2022.  Pt was grossly ruptured on arrival, clear fluid, ROM test positive.  Pt desires repeat  delivery to avoid the risk of uterine rupture.  Pt reports occasional mild contraction after rupture of membranes.    Otherwise, pt has no major complaints this morning and denies vaginal bleeding and notes active fetal movement.    Pt antepartum course has been overall uneventful.    Pregnancy complicated by:   x 1, declined TOLAC  Smokes tobacco    OB History    Para Term  AB Living   2 1 1 0 0 1   SAB IAB Ectopic Multiple Live Births   0 0 0 0 1      # Outcome Date GA Lbr Matt/2nd Weight Sex Delivery Anes PTL Lv   2 Current            1 Term 19 38w6d  3.186 kg (7 lb 0.4 oz) F CS-LTranv Spinal, EPI N RONEY      Complications: Failure to Progress in Second Stage      Name: MCKAY CAZARES      Apgar1: 9  Apgar5: 9     PMH - none  PSH - none  MEDS - PNV  ALL - NKDA  SH - smokes tobacco; denies etoh, drugs  FH - denies congenital defects, aneuploidy   GYN Hx - denies STI, abn pap    Review of Systems   Constitutional: Negative.    HENT: Negative.     Eyes: Negative.    Respiratory: Negative.     Cardiovascular: Negative.    Gastrointestinal: Negative.    Genitourinary: Negative.    Musculoskeletal:  Positive for back pain.   Skin: Negative.    Neurological: Negative.    Endo/Heme/Allergies: Negative.    Psychiatric/Behavioral: Negative.       Vitals:    Temp:  [98.1 °F (36.7 °C)] 98.1 °F (36.7  °C)  Pulse:  [] 102  Resp:  [19] 19  SpO2:  [98 %-100 %] 100 %  BP: (109-120)/(67-75) 119/74    /74   Pulse 102   Temp 98.1 °F (36.7 °C) (Oral)   Resp 19   Ht 5' (1.524 m)   Wt 72.6 kg (160 lb)   LMP 2021   SpO2 100%   Breastfeeding No   BMI 31.25 kg/m²     Physical Exam:     GENERAL:  No acute distress.  Alert and oriented to person, place and time.  HEENT:  Normocephalic, atraumatic, anicteric, moist mucus membranes.  Neck supple without masses.  LUNGS:  Clear to auscultation bilaterally.  HEART:  Regular rate & rhythm with physiologic heart sounds.  ABDOMEN:  Soft, non tender without any guarding, rigidity or rebound. Normoactive bowel sounds.  PELVIC:  Normal female external genitalia without gross lesions, rashes or excoriations. No gross vaginal or cervical lesions.  Cervix:  FT, thick and high, grossly ruptured, clear fluid.  EXTREMITIES:  Symmetric without cramping, claudication. Trace edema LE. +2 distal pulses.  Full range of motion.  SKIN:  No rashes, good turgor & capillary refill.  NEUROLOGIC:  Grossly intact bilaterally.   PSYCH:  Mood & affect appropriate.       Laboratory:     Recent Labs   Lab 22  0320   WBC 12.34   RBC 3.94*   HGB 12.4   HCT 34.9*      MCV 89   MCH 31.5*   MCHC 35.5     ABO:  O POS  GBS:  unknown    NST:     Category: 1  Tocometry: irregular ctx    Assessment:     22 y.o.  at 36w1d    premature rupture of membranes 2022 ~130AM prior to arrival  H/o  delivery x 1  Declined trial of labor  GBS unknown    Plan:     Admit to L&D for repeat     The patient was informed of the risks, benefits, alternatives and possible complications to  section and blood transfusion including pre-admission, admission, and post-admission procedures and expectations.  Risks of  section included, but were not limited to, death, urinary and/or fecal incontinence, injury to bladder and/or urinary tract, injury to bowel  and/or intestinal obstruction, risk of infection, damage to major blood vessels, hemorrhage requiring transfusion of blood products and/or hysterectomy, painful intercourse, ovarian failure requiring hormone administration, pulmonary embolism, fistula formation, sexual dysfunction, failure of wound to heal, hernia, permanent and disfiguring scarring.  In the event of a hysterectomy +/- bilateral salpingo-oophorectomy (BS&O) if uterine/ovarian injury or uncontrollable hemorrhage were to occur, the patient was counseled extensively on the inability to become pregnant following a hysterectomy and in the event of a BS&O will result in surgical menopause.  The patient expressed understanding of the risks involved.  All questions were answered and the patient was consented for  section, blood transfusion, and all indicated procedures.      Routine admit labs    Consult anesthesia, spinal/epidural prn    GBS prophylaxis      Keaton Mcallister MD

## 2022-08-29 NOTE — TRANSFER OF CARE
Anesthesia Transfer of Care Note    Patient: Kaye Long    Procedure(s) Performed: Procedure(s) (LRB):   SECTION (N/A)    Patient location: Labor and Delivery    Anesthesia Type: spinal    Transport from OR: Transported from OR on room air with adequate spontaneous ventilation    Post pain: adequate analgesia    Post assessment: no apparent anesthetic complications and tolerated procedure well    Post vital signs: stable    Level of consciousness: awake, alert and oriented    Nausea/Vomiting: no nausea/vomiting    Complications: none    Transfer of care protocol was followed      Last vitals:   Visit Vitals  /78   Pulse 92   Temp 36.7 °C (98 °F)   Resp 18   Ht 5' (1.524 m)   Wt 72.6 kg (160 lb)   LMP 2021   SpO2 98%   Breastfeeding No   BMI 31.25 kg/m²

## 2022-08-30 LAB
BASOPHILS # BLD AUTO: 0.06 K/UL (ref 0–0.2)
BASOPHILS NFR BLD: 0.4 % (ref 0–1.9)
DIFFERENTIAL METHOD: ABNORMAL
EOSINOPHIL # BLD AUTO: 0.4 K/UL (ref 0–0.5)
EOSINOPHIL NFR BLD: 2.5 % (ref 0–8)
ERYTHROCYTE [DISTWIDTH] IN BLOOD BY AUTOMATED COUNT: 13.1 % (ref 11.5–14.5)
HCT VFR BLD AUTO: 31.1 % (ref 37–48.5)
HGB BLD-MCNC: 11.1 G/DL (ref 12–16)
IMM GRANULOCYTES # BLD AUTO: 0.14 K/UL (ref 0–0.04)
IMM GRANULOCYTES NFR BLD AUTO: 1 % (ref 0–0.5)
LYMPHOCYTES # BLD AUTO: 2.7 K/UL (ref 1–4.8)
LYMPHOCYTES NFR BLD: 18.6 % (ref 18–48)
MCH RBC QN AUTO: 32.1 PG (ref 27–31)
MCHC RBC AUTO-ENTMCNC: 35.7 G/DL (ref 32–36)
MCV RBC AUTO: 90 FL (ref 82–98)
MONOCYTES # BLD AUTO: 1.4 K/UL (ref 0.3–1)
MONOCYTES NFR BLD: 9.8 % (ref 4–15)
NEUTROPHILS # BLD AUTO: 9.7 K/UL (ref 1.8–7.7)
NEUTROPHILS NFR BLD: 67.7 % (ref 38–73)
NRBC BLD-RTO: 0 /100 WBC
PLATELET # BLD AUTO: 242 K/UL (ref 150–450)
PMV BLD AUTO: 9.9 FL (ref 9.2–12.9)
RBC # BLD AUTO: 3.46 M/UL (ref 4–5.4)
RPR SER QL: NORMAL
WBC # BLD AUTO: 14.27 K/UL (ref 3.9–12.7)

## 2022-08-30 PROCEDURE — 11000001 HC ACUTE MED/SURG PRIVATE ROOM

## 2022-08-30 PROCEDURE — 85025 COMPLETE CBC W/AUTO DIFF WBC: CPT | Performed by: OBSTETRICS & GYNECOLOGY

## 2022-08-30 PROCEDURE — 99231 SBSQ HOSP IP/OBS SF/LOW 25: CPT | Mod: ,,, | Performed by: OBSTETRICS & GYNECOLOGY

## 2022-08-30 PROCEDURE — 63600175 PHARM REV CODE 636 W HCPCS: Performed by: OBSTETRICS & GYNECOLOGY

## 2022-08-30 PROCEDURE — 25000003 PHARM REV CODE 250: Performed by: OBSTETRICS & GYNECOLOGY

## 2022-08-30 PROCEDURE — 99231 PR SUBSEQUENT HOSPITAL CARE,LEVL I: ICD-10-PCS | Mod: ,,, | Performed by: OBSTETRICS & GYNECOLOGY

## 2022-08-30 PROCEDURE — 36415 COLL VENOUS BLD VENIPUNCTURE: CPT | Performed by: OBSTETRICS & GYNECOLOGY

## 2022-08-30 RX ADMIN — KETOROLAC TROMETHAMINE 30 MG: 30 INJECTION, SOLUTION INTRAMUSCULAR at 12:08

## 2022-08-30 RX ADMIN — IBUPROFEN 800 MG: 400 TABLET, FILM COATED ORAL at 09:08

## 2022-08-30 RX ADMIN — OXYCODONE HYDROCHLORIDE AND ACETAMINOPHEN 1 TABLET: 5; 325 TABLET ORAL at 05:08

## 2022-08-30 RX ADMIN — KETOROLAC TROMETHAMINE 30 MG: 30 INJECTION, SOLUTION INTRAMUSCULAR at 06:08

## 2022-08-30 RX ADMIN — IBUPROFEN 800 MG: 400 TABLET, FILM COATED ORAL at 01:08

## 2022-08-30 RX ADMIN — DOCUSATE SODIUM 200 MG: 100 CAPSULE, LIQUID FILLED ORAL at 08:08

## 2022-08-30 RX ADMIN — MUPIROCIN: 20 OINTMENT TOPICAL at 08:08

## 2022-08-30 RX ADMIN — MUPIROCIN: 20 OINTMENT TOPICAL at 09:08

## 2022-08-30 RX ADMIN — PRENATAL VIT W/ FE FUMARATE-FA TAB 27-0.8 MG 1 TABLET: 27-0.8 TAB at 08:08

## 2022-08-30 RX ADMIN — DOCUSATE SODIUM 200 MG: 100 CAPSULE, LIQUID FILLED ORAL at 09:08

## 2022-08-30 NOTE — PROGRESS NOTES
[facility]    Obstetrics & Gynecology  Progress Note      Patient Name:  Kaye Long  MRN:  [id number]  Admission Date:  2022  Hospital Length of Stay:  1  Attending Physician:  Keaton Mcallister MD  Primary Care Provider:  Marita Norris MD    Subjective:     Date:  2022    Hospital Course:  Post-op Day # 1 - s/p repeat  at 36w1d PPROM    Patient c/o crampy incisional pain  No acute events overnight  Denies dizziness, SOB, VALLE, or CP  Pain well controlled  Lochia less than menses  Bottle/breast feeding well  Breast non-tender, non-engorged  Ambulating, tolerating diet, and voiding without diffculty   Bonding well with baby    Objective:     Temp:  [97.9 °F (36.6 °C)-98.7 °F (37.1 °C)] 97.9 °F (36.6 °C)  Pulse:  [67-95] 95  Resp:  [18-20] 20  SpO2:  [97 %-100 %] 98 %  BP: ()/(55-59) 110/56    BP (!) 110/56 (BP Location: Left arm, Patient Position: Lying)   Pulse 95   Temp 97.9 °F (36.6 °C) (Oral)   Resp 20   Ht 5' (1.524 m)   Wt 72.6 kg (160 lb)   LMP 2021   SpO2 98%   Breastfeeding Yes   BMI 31.25 kg/m²     Intake/Output Summary (Last 24 hours) at 2022 1514  Last data filed at 2022 1322  Gross per 24 hour   Intake 3065.07 ml   Output 2150 ml   Net 915.07 ml   Clear, loly urine    Physical Exam:   Constitutional: She appears well-developed. No distress.                             Alert and oriented x 3.   HEENT:  No scleral icterus. Neck supple. Moist mucus membranes.   LUNGS:  Clear to auscultation bilaterally.   HEART:  Regular rate and rhythm with physiologic heart sounds.   ABDOMEN:  Soft, appropriately tender, non-distended, no guarding, rigidity, or rebound with normoactive bowel sounds.  FUNDUS:  Firm, nontender below the umbilicus.   INCISION: Aquacel bandage clean, dry, & intact.  EXTREMITIES:  No cramping, claudication. Trace edema LE. +2 distal pulses. Symmetric, full range of motion, negative Christianson.  NEUROLOGIC:  Grossly intact  bilaterally.  +2 DTR's.   PSYCH:  Mood and affect appropriate.   PERINEUM:  Light lochia.     Labs:      Recent Results (from the past 336 hour(s))   CBC auto differential    Collection Time: 22  4:39 AM   Result Value Ref Range    WBC 14.27 (H) 3.90 - 12.70 K/uL    Hemoglobin 11.1 (L) 12.0 - 16.0 g/dL    Hematocrit 31.1 (L) 37.0 - 48.5 %    Platelets 242 150 - 450 K/uL   CBC with Auto Differential    Collection Time: 22  3:20 AM   Result Value Ref Range    WBC 12.34 3.90 - 12.70 K/uL    Hemoglobin 12.4 12.0 - 16.0 g/dL    Hematocrit 34.9 (L) 37.0 - 48.5 %    Platelets 274 150 - 450 K/uL     ABO:  O POS    Assessment:     Post-op day # 1 - IUP at 36w1d s/p repeat low transverse  secondary to PPROM - progressing well    Plan:     Continue post-op care  Review post-partum care instructions and precautions  Advance diet as tolerated  Encourage ambulation, SCD's  Encourage breast-feeding  Surgical/delivery findings, labs/studies, and expectations were discussed with pt.  All questions answered and pt voiced understanding.     Disposition:  As patient meets milestones, will plan to discharge.      Keaton Mcallister MD

## 2022-08-30 NOTE — PLAN OF CARE
VSS. Ambulating and voiding. Regular diet, tolerating well. Active bowel sounds passing flatus. Good pain management. Bonding with infant. Verbalizes understanding of plan of care with good recall.  Family at bedside.

## 2022-08-30 NOTE — LACTATION NOTE
08/30/22 1505   Breast Pumping   Breast Pumping bilateral breasts pumped until soft   Reproductive Interventions   Breastfeeding Assistance electric breast pump used;feeding session observed;hand expression verified;support offered;other (see comments)   MEDELA pump in room, instructed as to care and assembly of parts, pumping for your baby in NICU booklet given and reviewed. Supplied syringes and labels. Verbalized understanding of instructions.

## 2022-08-31 PROCEDURE — 11000001 HC ACUTE MED/SURG PRIVATE ROOM

## 2022-08-31 PROCEDURE — 99231 SBSQ HOSP IP/OBS SF/LOW 25: CPT | Mod: ,,, | Performed by: OBSTETRICS & GYNECOLOGY

## 2022-08-31 PROCEDURE — 99231 PR SUBSEQUENT HOSPITAL CARE,LEVL I: ICD-10-PCS | Mod: ,,, | Performed by: OBSTETRICS & GYNECOLOGY

## 2022-08-31 PROCEDURE — 25000003 PHARM REV CODE 250: Performed by: OBSTETRICS & GYNECOLOGY

## 2022-08-31 RX ADMIN — DOCUSATE SODIUM 200 MG: 100 CAPSULE, LIQUID FILLED ORAL at 08:08

## 2022-08-31 RX ADMIN — IBUPROFEN 800 MG: 400 TABLET, FILM COATED ORAL at 01:08

## 2022-08-31 RX ADMIN — MUPIROCIN: 20 OINTMENT TOPICAL at 09:08

## 2022-08-31 RX ADMIN — IBUPROFEN 800 MG: 400 TABLET, FILM COATED ORAL at 05:08

## 2022-08-31 RX ADMIN — IBUPROFEN 800 MG: 400 TABLET, FILM COATED ORAL at 09:08

## 2022-08-31 RX ADMIN — PRENATAL VIT W/ FE FUMARATE-FA TAB 27-0.8 MG 1 TABLET: 27-0.8 TAB at 08:08

## 2022-08-31 RX ADMIN — DOCUSATE SODIUM 200 MG: 100 CAPSULE, LIQUID FILLED ORAL at 09:08

## 2022-08-31 RX ADMIN — MUPIROCIN: 20 OINTMENT TOPICAL at 08:08

## 2022-08-31 NOTE — PLAN OF CARE
Problem: Adult Inpatient Plan of Care  Goal: Plan of Care Review  Outcome: Adequate for Care Transition  Flowsheets (Taken 2022 0552)  Plan of Care Reviewed With: patient     Problem: Adult Inpatient Plan of Care  Goal: Patient-Specific Goal (Individualized)  Outcome: Adequate for Care Transition  Flowsheets (Taken 2022 0552)  Anxieties, Fears or Concerns: For baby to get better  Individualized Care Needs: To adequately pump breast milk  for baby  Patient-Specific Goals (Include Timeframe): Pain control     Problem: Adult Inpatient Plan of Care  Goal: Absence of Hospital-Acquired Illness or Injury  Outcome: Adequate for Care Transition     Problem: Infection (Postpartum  Delivery)  Goal: Absence of Infection Signs and Symptoms  Outcome: Adequate for Care Transition     Problem: Pain (Postpartum  Delivery)  Goal: Acceptable Pain Control  Outcome: Adequate for Care Transition

## 2022-08-31 NOTE — LACTATION NOTE
08/31/22 0920   Maternal Assessment   Breast Density Bilateral:;soft   Maternal Infant Feeding   Maternal Emotional State independent;relaxed   Equipment Type   Breast Pump Type double electric, hospital grade   Breast Pump Flange Type hard   Breast Pump Flange Size 24 mm   Breast Pumping   Breast Pumping Interventions frequent pumping encouraged   Breast Pumping double electric breast pump utilized   Mother with infant in NICU -pumping independently now -denies any breast or nipple pain -states baby was breastfeeding yesterday -will try to breastfeed as able in NICU today -encouraged frequent pumping at least 8 times in 24 hours while  from baby

## 2022-08-31 NOTE — DISCHARGE INSTRUCTIONS
Breastfeeding discharge instructions given with First Alert form and reviewed. Please complete First Alert within 3-5 days after the baby's birth. ( Please call the Breastfeeding Warmline ( [phone]) or the baby's pediatrician if you have any concerns.     Also discussed:  AAP recommendation of exclusive breastfeeding for the first 6 months of life and continued breastfeeding with the introduction of supplemental foods beyond the first year of life. The AAP recommends breastfeeding for for at least a year or longer. Instructed on the recommendation to delay all bottle and pacifier use until after 4 weeks of age and breastfeeding is well established.  Discussed the benefits of exclusive breastfeeding for both mother and baby.  Discussed the risks of supplementation/pacifier use on the exclusivity of breastfeeding in the first 6 months. Feed the baby at the earliest sign of hunger or comfort  Hands to mouth, sucking motions  Rooting or searching for something to suck on  Dont wait for crying - it is a not a late sign of hunger; it is a sign of distress    The feedings may be 8-12 times per 24hrs and will not follow a schedule  Alternate the breast you start the feeding with, or start with the breast that feels the fullest  Switch breasts when the baby takes himself off the breast or falls asleep  Keep offering breasts until the baby looks full, no longer gives hunger signs, and stays asleep when placed on his back in the crib  If the baby is sleepy and wont wake for a feeding, put the baby skin-to-skin dressed in a diaper against the mothers bare chest  Sleep near your baby  The baby should be positioned and latched on to the breast correctly  Chest-to-chest, chin in the breast  Babys lips are flipped outward  Babys mouth is stretched open wide like a shout  Babys sucking should feel like tugging to the mother  The baby should be drinking at the breast:  You should hear swallowing or gulping throughout  the feeding  You should see milk on the babys lips when he comes off the breast  Your breasts should be softer when the baby is finished feeding  The baby should look relaxed at the end of feedings  After the 4th day and your milk is in:  The babys poop should turn bright yellow and be loose, watery, and seedy  The baby should have at least 3-4 poops the size of the palm of your hand per day  The baby should have at least 6-8 wet diapers per day  The urine should be light yellow in color  You should drink when you are thirsty and eat a healthy diet when you are    hungry.     Take naps to get the rest you need.   Take medications and/or drink alcohol only with permission of your obstetrician    or the babys pediatrician.  You can also call the [facility],   ([phone]), Monday-Friday, 8am-5pm Central time, to get the most   up-to-date evidence-based information on the use of medications during   pregnancy and breastfeeding.      The baby should be examined by a pediatrician at 3-5 days of age and again at 2 weeks of age unless ordered sooner by the pediatrician.  Once your milk production increases the baby should gain at least 1/2-1oz each day and should be back to birth weight no later than 10-14 days of age.If this is not the case, please call the Breastfeeding Warmline ( [phone]) for assistance and support. Instructed on primary engorgement and precautions.  Discussed:    Typical timing of the onset of engorgement  Signs and symptoms of engorgement  If the milk is flowing, use wet or dry heat applied to the breasts for approximately 10min prior to each feeding as a comfort measure to facilitate the milk ejection reflex    Follow heat treatment with breast massage to soften hard/lumpy areas of the breast    Use unrestricted, frequent, effective feedings    Wake baby to feed if necessary    Avoid pacifier and bottle feedings    Hand express or pump breasts to the point of comfort as  needed    Use cold treatments in the form of ice packs/gel packs/ frozen vegetables wrapped in a soft thin cloth and applied to the breasts for approximately 20min after each feeding until engorgement is resolved    Wear comfortable, supportive bra    Take pain medicine as needed    Use anti-inflammatory medications if prescribed by physician     Pumping for the Baby in NICU    Preparation and Hygiene:  Shower daily.  Wear a clean bra each day and wash daily in warm soapy water.  Change wet or moist breast pads frequently.  Moist pads can promote growth of germs.  Actively wash your hands, paying close attention to the area around and under your fingernails, thoroughly with soap and water for 15 seconds before pumping or handling your milk.  Re-wash your hands if you touch anything (scratching your nose, answering the phone, etc) while pumping or handling your milk.   Before pumping your breasts, assemble the pump collection kit and have ready the sterile container and labels.  Place these items on a clean surface next to the breast pump.  Each time after you have finished pumping, take apart all of the parts of the breast pump collection kit and place them in a separate cleaning container (do not place them in the sink).  Be sure to remove the yellow valve from the breast shield and separate the white membrane from the yellow valve.  Rinse all of these parts with cool water.  Then use a new sponge and/or bottle brush and dishwashing detergent to clean the parts.  Rinse off the soapy water with cool water and air dry on a clean towel covered with a clean cloth.  All parts may also be washed after each use in the top rack of a .  Once each day, sanitize all of the parts of the breast pump collection kit.  This can be done by boiling the kit parts for 10 minutes or by using a Quick Clean Micro-Steam Bag made by [employer]  If condensation appears in the tubing, continue to run the pump with the tubing  attached for 1-2 minutes or until the tubing is dry.   Notify your babys nurse or doctor if you become ill or need to take any medication, even over-the-counter medicines.  Collection and Storage of Expressed Breast milk:       1. Pump your breasts at least 8-10 times every 24 hours.  Double pump (both breasts at the same time) for at least 15-20 minutes using the most suction that is comfortable.    2. Write the date and time of pumping and the name of any medications you are taking on the babys pre-printed hospital identification label.   3. Place your babys pre-printed hospital identification label on each container of breast milk.  Additional pre-printed labels can be obtained from your babys nurse.  If your expressed breast milk is not correctly labeled, the nurse cannot feed the milk to the baby.       4.    Do not touch the inside of the storage containers or lids.  5.        Pour the amount of expressed milk needed for 1 of your babys feedings into each storage container.  Use a new container(s) for each pumping.  Additional storage containers can be obtained from your babys nurse.  6. Tightly screw the lid onto the container and place immediately into the refrigerator for daily transportation to the hospital.   Do not freeze your milk unless asked to do so by your babys nurse.  However, if you are not able to visit your baby each day, place the expressed breast milk in the freezer.  7.     Expressed breast milk should be refrigerated or frozen within 4 hours of pumping.  8.         Do not store expressed breast milk on the door of your refrigerator or freezer where the temperature is warmer.   Transportation of Expressed Breast milk:  Refrigerated breast milk or frozen milk should be packed tightly together in a cooler with frozen, gel-packs to keep the milk frozen.  DO NOT USE ICE CUBES (WET ICE) TO TRANSPORT FROZEN MILK.   A clean towel can be used to fill any extra space between containers of  frozen milk.  2.    Bring your expressed milk from home each time you visit the baby.        After a Cesearean Birth    General Discharge Instructions  May follow a regular diet, unless otherwise discussed with physician.  Take showers, not baths unless otherwise discussed with physician.  Activity as tolerated.  No lifting or heavy exercise for 6 weeks, no driving for 2 weeks, no sexual intercourse, douching or tampons for 6 weeks  May return to work/school as discussed with physician  Discuss birth control with physician  Breast care support bra worn at all times  Lactation consultant referral number ( [phone] or [phone])      Call Your Healthcare Provider Right Away If You Have:  A fever of 101°F or higher.  Incisional drainage  Heavy vaginal bleeding, clots, or vaginal discharge with foul odor.  Redness, discharge, or pain worse than you had in the hospital.  Burning, pain, red streaks, or lumpy areas in your breasts.  Cracks, blisters, or blood on your nipples.  Burning or pain when you urinate.  Persistent nausea or vomiting.  Severe headaches, blurred vision, dizziness or fainting.  Feelings of extreme sadness or anxiety, or a feeling that you dont want to be with your baby.  No bowel movement for 5 days.  Redness, warmth, swelling, or pain in the lower leg.    Incision Care  You will be able to shower and pat the incision dry.  Watch your incision for signs of infection, such as increasing redness or drainage.  For ease of movement, hold a pillow against the incision when you get up from a lying or sitting position, and when you laugh or cough.  Avoid heavy lifting--nothing heavier than your baby until your doctor instructs you otherwise.       Follow-Up  Schedule a  follow-up exam with your healthcare provider for about 1-2 weeks after delivery. During this exam, your uterus and vaginal area will be checked. Contact your healthcare provider if you think you or your baby are  having any problems.        Birth ()   A  birth is the surgical delivery of a baby through an incision in the mothers abdomen.  births may be planned and scheduled. But, in many cases, a  is unexpected. In any case, a  is done to ensure the safest birth for both you and your baby.     Preparing for the Birth   The preparation for the birth is nearly the same whether scheduled or unscheduled. Surgery will begin shortly after you receive anesthesia. You will receive either regional or general anesthesia. Most cesareans are completed in less than an hour. During the birth, your healthcare team is with you, ready to take care of you and your . Your partner may also be with you for the birth     Making the Incisions   In a  birth, incisions are made in both the skin and the uterus. Either incision may be transverse (from side to side) or vertical. Your skin and uterine incisions may differ. Be sure they are noted in your health records.   The skin incision is usually transverse (side to side). It is located at the pubic hairline. A vertical incision may be used if youve had this incision before or if the  needs to be done quickly.   The uterine incision is almost always transverse. A transverse incision heals very well. This may allow for a future vaginal birth (). In certain cases, a vertical uterine incision may be made.           Your Babys Birth   Once the incisions are made, the doctor presses on the top of the uterus and guides the baby through the incision. The cord will be clamped and cut. Then the placenta is lifted out through the incision.   Taking Care of You   After your babys birth, the uterine incision is closed with stitches. Then, your skin incision will be closed with surgical staples or stitches and a dressing will be applied. Your doctor will press on your uterus. This helps expel blood clots through the vagina. You may be  given medications to help shrink your uterus and decrease bleeding. You may also receive antibiotics to reduce any risk of infection.     Taking Care of Your Baby   While your surgery is completed, your baby will be placed in an infant warmer. Gentle suction will be used to help remove excess fluid from the babys mouth and airways. Then the APGAR score will be done. This rates babys appearance (color), pulse (heart rate), grimace (muscle reflex), activity, and respiration. Your baby will be wrapped in a blanket and brought to you. Now, for the first time, youll see your .     Risks of    As with any surgery,  birth has risks. Your doctor will discuss the risks of  with you. They may include:   Bleeding   Infection   Injury to nearby organs   Reaction to anesthesia      © 3620-1553 [employer]. [address] [place] [address], [place] [address]. All rights reserved. This information is not intended as a substitute for professional medical care. Always follow your healthcare professional's instructions.        Discharge Instructions for  Section ()   You had a  section, or . During the , your baby was delivered through a surgical incision in your abdomen and uterus. Full recovery after a  can take time. Its important to take care of yourself--for your own sake and because your new baby needs you. Here are some guidelines to follow at home.     Incision Care   Shower as needed. Pat your incision dry.   Watch your incision for signs of infection, such as increasing redness or drainage.   Hold a pillow against the incision when you laugh or cough and when you get up from a lying or sitting position.   Remember, it can take as long as 6 weeks for a  incision to heal.    Activity   When to Call Your Doctor   Call your doctor right away if you have any of the following:   Fever of 100.4°F (38.0°C) or higher   Redness, pain,  or drainage at your incision site   Repeated clots of blood (the size of a quarter or larger) passing from the vagina   Bleeding that requires a new sanitary pad every hour   Severe pain in the abdomen   Pain or urgency with urination   Trouble urinating or emptying your bladder   No bowel movement within 1 week after the birth of your baby      Dont try to take care of anyone other than your baby and yourself.   Remember, the more active you are, the more likely you are to have an increase in your bleeding.   Get lots of rest. Take naps in the afternoon.   Increase your activities gradually.   Plan your activities so that you dont have to go up or down stairs more than necessary.   Do postsurgical deep breathing and coughing exercises. Ask your doctor for instructions.   Dont lift anything heavier than your baby until your doctor tells you its okay.   Dont drive until your doctor says its okay.   Dont have sexual intercourse until after youve had a follow-up appointment with your doctor and youve decided on a birth control method.   Dont take a tub bath or use douches or tampons for 4 weeks.    Follow-Up   Make a follow-up appointment as directed by our staff.     © 2591-0916 [employer]. [address] [place] [address], [place] [address]. All rights reserved. This information is not intended as a substitute for professional medical care. Always follow your healthcare professional's instructions.    After a    It can take time to recover fully after a . Its important to take care of yourself--both for your own sake and because your new baby needs you.     Incision care   You will probably be able to shower and pat the incision dry.   Watch your incision for signs of infection. These include redness that gets worse or fluid draining from it.   Hold a pillow against the incision when you get up from a lying or sitting position. Also do this when you laugh or cough.   Avoid heavy  lifting. Dont lift anything heavier than your baby until your doctor tells you otherwise.    When to call your health care provider   Call your health care provider if you have:   A fever of 100.4°F (38.0°C or higher)   Redness, pain, or discharge at the incision site that gets worse   Repeated clots the size of a quarter or larger, passing from your vagina   You need to use a new sanitary pad every hour because of vaginal bleeding   Severe pain in your abdomen   No bowel movement within one week after the birth of your baby      © 8108-1510 [employer]. [address] [place] [address]. All rights reserved. This information is not intended as a substitute for professional medical care. Always follow your healthcare professional's instructions      Breast Care After Birth   A few days after your babys birth, your breasts will swell with milk. They are likely to feel tender and heavy. This is normal. To help prevent breast soreness and control irritation, follow these tips:       Coping with swelling   Use cold compresses or an ice pack to help reduce the ache or pain.   Breastfeed often to keep milk from clogging your breast ducts.   If your nipples are flat from breast swelling, hand express some milk. Squeeze out a few drops of milk by massaging and compressing your breasts.   If you have swelling with pain or fever, call your health care provider.  Preventing sore nipples   Make sure baby latches on to your breast correctly. The babys tongue should always be under your nipple, and your entire areola should be in the baby's mouth.   You can let milk dry on your nipples. This dried milk can protect the skin on your nipple/breasts.   Do not use alcohol, soap, or scented cleansers on your breasts. These can cause the nipples to dry and crack.   Do not wear nursing pads that are lined with plastic. They hold in moisture and can cause chapping.   If you experience cracked or bleeding nipples,  consult your doctor or a lactation consultant. He or she will ensure that your baby's latch is correct and may suggest topical treatment, such as pure lanolin.  Choosing a good bra   Wearing the right-sized bra is especially important now. If a bra is too tight, it may cause a duct in your breast to clog and become irritated. If possible, have a salesperson help fit you for a new bra. Look for one thats 100% cotton and comfortable. Also, choose a bra with wide straps that wont dig into your back and shoulders. If youre breastfeeding, find a nursing bra that allows you to uncover one breast at a time.   If you are not breastfeeding:   Avoid stimulation of nipples   Wear a tight-fitting bra   Apply ice packs for discomfort

## 2022-08-31 NOTE — PLAN OF CARE
[facility]  Initial Discharge Assessment       Primary Care Provider: Marita Norris MD    Admission Diagnosis: Rupture of membranes with delay of delivery [O42.90]  38 weeks gestation of pregnancy [Z3A.38]    Admission Date: 8/29/2022  Expected Discharge Date:     Discharge Barriers Identified: None    Payor: MEDICAID / Plan: [place] [employer] / Product Type: Managed Medicaid /     Extended Emergency Contact Information  Primary Emergency Contact: NICK CORTES  Mobile Phone: [phone]  Relation: Friend  Secondary Emergency Contact: Melinda Long  Address: [address] [place] [address] [facility]  Home Phone: [phone]  Relation: Step parent    Discharge Plan A: Home  Discharge Plan B: Home      [id number] - [place] - [address] AT [facility] [place] [place] [address]  [address] [place] [address]  Phone: [phone] Fax: [phone]      Initial Assessment (most recent)       Adult Discharge Assessment - 08/31/22 1648          Discharge Assessment    Assessment Type Discharge Planning Assessment     Confirmed/corrected address, phone number and insurance No     Reason Other (see comment)   New Address:  [address] [place]  [address]    Source of Information patient     Lives With significant other     Facility Arrived From: home     Do you expect to return to your current living situation? Yes     Do you have help at home or someone to help you manage your care at home? Yes     Who are your caregiver(s) and their phone number(s)? Nick Cortes:  [phone]     Prior to hospitilization cognitive status: Alert/Oriented     Current cognitive status: Alert/Oriented     Walking or Climbing Stairs Difficulty none     Dressing/Bathing Difficulty none     Equipment Currently Used at Home none     Readmission within 30 days? No     Patient currently being followed by outpatient case management? No     Do you currently  have service(s) that help you manage your care at home? No     Do you have prescription coverage? Yes     Coverage medicaid     Who is going to help you get home at discharge? significant other     Are you on dialysis? No     Do you take coumadin? No     Discharge Plan A Home     Discharge Plan B Home     DME Needed Upon Discharge  other (see comments)     Discharge Plan discussed with: Patient;Spouse/sig other     Discharge Barriers Identified None        Relationship/Environment    Name(s) of Who Lives With Patient significant other

## 2022-08-31 NOTE — PROGRESS NOTES
[facility]    Obstetrics & Gynecology  Progress Note      Patient Name:  Kaye Long  MRN:  [id number]  Admission Date:  2022  Hospital Length of Stay:  2  Attending Physician:  Keaton Mcallister MD  Primary Care Provider:  Marita Norris MD    Subjective:     Date:  2022    Hospital Course:  Post-op Day # 2 - s/p repeat  at 36w1d PPROM    Pt has no major complaints today.  No acute events overnight  Denies dizziness, SOB, VALLE, or CP  Pain well controlled  Lochia less than menses  Bottle/breast feeding well  Breast non-tender, non-engorged  Ambulating, tolerating diet, and voiding without diffculty   Bonding well with baby    Objective:     Temp:  [97.9 °F (36.6 °C)-98.4 °F (36.9 °C)] 98 °F (36.7 °C)  Pulse:  [73-91] 85  Resp:  [17-20] 18  SpO2:  [98 %-100 %] 98 %  BP: (101-120)/(54-63) 120/55    BP (!) 120/55 (BP Location: Left arm, Patient Position: Sitting)   Pulse 85   Temp 98 °F (36.7 °C) (Oral)   Resp 18   Ht 5' (1.524 m)   Wt 72.6 kg (160 lb)   LMP 2021   SpO2 98%   Breastfeeding Yes   BMI 31.25 kg/m²   No intake or output data in the 24 hours ending 22 1857  Clear, loly urine    Physical Exam:   Constitutional: She appears well-developed. No distress.                             Alert and oriented x 3.   HEENT:  No scleral icterus. Neck supple. Moist mucus membranes.   LUNGS:  Clear to auscultation bilaterally.   HEART:  Regular rate and rhythm with physiologic heart sounds.   ABDOMEN:  Soft, appropriately tender, non-distended, no guarding, rigidity, or rebound with normoactive bowel sounds.  FUNDUS:  Firm, nontender below the umbilicus.   INCISION: Aquacel bandage clean, dry, & intact.  EXTREMITIES:  No cramping, claudication. Trace edema LE. +2 distal pulses. Symmetric, full range of motion, negative Christianson.  NEUROLOGIC:  Grossly intact bilaterally.  +2 DTR's.   PSYCH:  Mood and affect appropriate.   PERINEUM:  Light lochia.     ABO:  O  POS    Assessment:     Post-op day # 2 - IUP at 36w1d s/p repeat low transverse  secondary to PPROM - progressing well    Plan:     Continue post-op care  Continue advances  Encourage ambulation, SCD's  Encourage breast-feeding  Plan of care d/w pt, all questions answered and pt voiced understanding.     Disposition:  As patient meets milestones, will plan to discharge tomorrow.      Keaton Mcallister MD

## 2022-08-31 NOTE — PLAN OF CARE
VSS, Breastfeeding and pumping for baby in NICU, encouraged 8 times in 24 hours. Supportive bra encouraged. Fundus and lochia WDL. LTVA Aquacel dressing noted with small amount of dried drainage with area marked and no changes noted. BS+ 4 quads, states flatus, ambulating , voiding and tolerating regular diet. Bonding well with baby in NICU. Pain being managed with  percocet, Stated an understanding to POC.  SO at bedside assisting with needs.

## 2022-09-01 VITALS
TEMPERATURE: 96 F | HEIGHT: 60 IN | DIASTOLIC BLOOD PRESSURE: 57 MMHG | HEART RATE: 93 BPM | OXYGEN SATURATION: 100 % | WEIGHT: 160 LBS | BODY MASS INDEX: 31.41 KG/M2 | SYSTOLIC BLOOD PRESSURE: 105 MMHG | RESPIRATION RATE: 20 BRPM

## 2022-09-01 PROCEDURE — 99238 PR HOSPITAL DISCHARGE DAY,<30 MIN: ICD-10-PCS | Mod: ,,, | Performed by: OBSTETRICS & GYNECOLOGY

## 2022-09-01 PROCEDURE — 99238 HOSP IP/OBS DSCHRG MGMT 30/<: CPT | Mod: ,,, | Performed by: OBSTETRICS & GYNECOLOGY

## 2022-09-01 PROCEDURE — 25000003 PHARM REV CODE 250: Performed by: OBSTETRICS & GYNECOLOGY

## 2022-09-01 RX ORDER — OXYCODONE AND ACETAMINOPHEN 5; 325 MG/1; MG/1
1 TABLET ORAL EVERY 6 HOURS PRN
Qty: 20 TABLET | Refills: 0 | Status: SHIPPED | OUTPATIENT
Start: 2022-09-01

## 2022-09-01 RX ORDER — FERROUS GLUCONATE 324(38)MG
324 TABLET ORAL 2 TIMES DAILY
Qty: 60 TABLET | Refills: 0 | Status: SHIPPED | OUTPATIENT
Start: 2022-09-01

## 2022-09-01 RX ORDER — IBUPROFEN 600 MG/1
600 TABLET ORAL EVERY 6 HOURS PRN
Qty: 40 TABLET | Refills: 0 | OUTPATIENT
Start: 2022-09-01 | End: 2024-01-11

## 2022-09-01 RX ADMIN — PRENATAL VIT W/ FE FUMARATE-FA TAB 27-0.8 MG 1 TABLET: 27-0.8 TAB at 08:09

## 2022-09-01 RX ADMIN — IBUPROFEN 800 MG: 400 TABLET, FILM COATED ORAL at 05:09

## 2022-09-01 RX ADMIN — DOCUSATE SODIUM 200 MG: 100 CAPSULE, LIQUID FILLED ORAL at 08:09

## 2022-09-01 RX ADMIN — MUPIROCIN: 20 OINTMENT TOPICAL at 08:09

## 2022-09-01 NOTE — PROGRESS NOTES
Discharge teaching given to pt. With prescription education. Pt. verbalized understanding with good recall noted. Pt. enc. To call md office once discharged to schedule a f/u appt. and for any questions or concerns that may arise once discharged. No c/o pain at this time. Gave patient information regarding community resources for discharge.

## 2022-09-01 NOTE — LACTATION NOTE
09/01/22 1100   Maternal Assessment   Breast Density Bilateral:;soft;filling   Areola Bilateral:;elastic   Nipples Bilateral:;everted   Maternal Infant Feeding   Maternal Emotional State independent;relaxed   Equipment Type   Breast Pump Type double electric, hospital grade   Breast Pump Flange Type hard   Breast Pump Flange Size 24 mm   Breast Pumping   Breast Pumping Interventions frequent pumping encouraged   Breast Pumping bilateral breasts pumped until soft;double electric breast pump utilized;pre-pumping breast massage   Community Referrals   Community Referrals outpatient lactation program;WIC (women, infants and children) program   Mother with baby in NICU -pumping now -milk filling in and states pump, emptying well -able to collect larger volumes for baby in NICU-ready for discharge today -offered Loaner pump but has personal Medela pump at home for use and has Rx for pump as needed - review discharge guidelines for pumping, storing and transport of milk to NICU -states understanding and all questions answered

## 2022-09-02 NOTE — ANESTHESIA POSTPROCEDURE EVALUATION
Anesthesia Post Evaluation    Patient: Kaye Long    Procedure(s) Performed: Procedure(s) (LRB):   SECTION (N/A)    Final Anesthesia Type: spinal      Patient location during evaluation: labor & delivery  Patient participation: Yes- Able to Participate  Level of consciousness: awake and alert  Post-procedure vital signs: reviewed and stable  Pain management: adequate  Airway patency: patent    PONV status at discharge: No PONV  Anesthetic complications: no      Cardiovascular status: blood pressure returned to baseline and hemodynamically stable  Respiratory status: unassisted and spontaneous ventilation  Hydration status: euvolemic  Follow-up not needed.          Vitals Value Taken Time   /57 22 0755   Temp 35.6 °C (96.1 °F) 22 0755   Pulse 93 22 0755   Resp 20 22 0755   SpO2 100 % 22 0755         Event Time   Out of Recovery 07:15:00         Pain/Lupillo Score: Pain Rating Prior to Med Admin: 4 (2022  5:48 AM)

## 2022-09-02 NOTE — ANESTHESIA PROCEDURE NOTES
Spinal    Diagnosis: IUP  Patient location during procedure: OR  Start time: 8/29/2022 4:42 AM  Timeout: 8/29/2022 4:42 AM  End time: 8/29/2022 4:47 AM    Staffing  Authorizing Provider: Jorge Chang MD  Performing Provider: Jorge Chang MD    Preanesthetic Checklist  Completed: patient identified, IV checked, risks and benefits discussed, surgical consent, monitors and equipment checked, pre-op evaluation and timeout performed  Spinal Block  Patient position: sitting  Prep: ChloraPrep  Patient monitoring: heart rate, cardiac monitor, continuous pulse ox and frequent blood pressure checks  Approach: midline  Location: L4-5  Injection technique: single shot  CSF Fluid: clear free-flowing CSF  Needle  Needle type: pencan.   Needle gauge: 25 G  Needle length: 3.5 in  Additional Documentation: negative aspiration for heme and no paresthesia on injection  Needle localization: anatomical landmarks  Assessment  Ease of block: easy  Patient's tolerance of the procedure: comfortable throughout block and no complaints

## 2022-09-02 NOTE — DISCHARGE SUMMARY
[facility]    Discharge Summary  Obstetrics & Gynecology      Patient Name:  Kaye Long  MRN:  [id number]  Admission Date:  2022  Discharge Date:  2022  Hospital Length of Stay:  3  Attending Physician:  Keaton Mcallister MD  Discharging Physician:  Keaton Mcallister MD  Primary Care Provider:  Marita Norris MD    Admitting Diagnosis:       Baca IUP at 36w1d   PPROM  H/o  delivery x 1  Declined trial of labor after      Discharge Diagnosis:    Baca IUP at 36w1d s/p repeat low transverse   Anemia, acute blood loss expected postop    Procedure performed:      Repeat low transverse  delivery via a pfannenstiel skin incision    Brief Hospital Course:      See chart for further details.       Kaye Long is a 22 y.o.  at 36w1d who presents to L&D with spontaneous rupture of membranes at home ~1:30AM 2022.  Pt was grossly ruptured on arrival, clear fluid, ROM test positive.  Pt desires repeat  delivery to avoid the risk of uterine rupture.  Pt reports occasional mild contraction after rupture of membranes.    Otherwise, pt has no major complaints this morning and denies vaginal bleeding and notes active fetal movement.    Pt antepartum course has been overall uneventful.     Pregnancy complicated by:   x 1, declined TOLAC  Smokes tobacco    Pt underwent a  delivery of a viable infant.  Please see  operative report for further details of the delivery.  Following surgery, pt was transfer to L& recovery for post-partum care.  Pt had a fairly uncomplicated postpartum course.   Prior to discharge, pt was without major complaints.  Pt pain was well controlled.  Pt was ambulating, tolerating a regular diet, voiding without difficulty, and bonding well with baby.  Pt lochia was light.  Pt vital signs where physiologic and stable.  Pt physical exam showed no acute findings.  Pt had satisfied routine  postpartum discharge criteria and expressed the desire to be discharge from the hospital.     Pertinent Labs or Studies:      Recent Results (from the past 336 hour(s))   CBC auto differential    Collection Time: 08/30/22  4:39 AM   Result Value Ref Range    WBC 14.27 (H) 3.90 - 12.70 K/uL    Hemoglobin 11.1 (L) 12.0 - 16.0 g/dL    Hematocrit 31.1 (L) 37.0 - 48.5 %    Platelets 242 150 - 450 K/uL   CBC with Auto Differential    Collection Time: 08/29/22  3:20 AM   Result Value Ref Range    WBC 12.34 3.90 - 12.70 K/uL    Hemoglobin 12.4 12.0 - 16.0 g/dL    Hematocrit 34.9 (L) 37.0 - 48.5 %    Platelets 274 150 - 450 K/uL     ABO:  O POS    Discharge Exam:      Temp:  [96.1 °F (35.6 °C)-98.1 °F (36.7 °C)] 96.1 °F (35.6 °C)  Pulse:  [] 93  Resp:  [18-20] 20  SpO2:  [98 %-100 %] 100 %  BP: (105-111)/(57-61) 105/57    BP (!) 105/57 (BP Location: Left arm, Patient Position: Lying)   Pulse 93   Temp 96.1 °F (35.6 °C) (Axillary)   Resp 20   Ht 5' (1.524 m)   Wt 72.6 kg (160 lb)   LMP 12/12/2021   SpO2 100%   Breastfeeding Yes   BMI 31.25 kg/m²     GENERAL:  No acute distress. Alert and oriented x 3.   HEENT:  Normocephalic. No scleral icterus. Neck supple. Moist mucus membranes.   LUNGS:  Clear to auscultation bilaterally.   HEART:  Regular rate and rhythm with physiologic heart sounds.   ABDOMEN:  Soft, appropriately tender, non-distended, no guarding, rigidity, or rebound.   FUNDUS:  Firm, nontender below the umbilicus.  INCISION:  Clean, dry, & intact without signs of infection.   EXTREMITIES:  No cramping, claudication.  Trace edema. Good skin turgor. +2 distal pulses, symmetric. Full range of motion.   NEUROLOGIC:  Grossly intact bilaterally.   PSYCH:  Mood and affect appropriate.   PERINEUM:  Intact with light lochia.    Discharge Condition:  Stable      Discharge Disposition:  Home       Discharge Medications:     Motrin  Percocet  Fergon    Discharge Activites:      Resume activities as tolerated with  adequate rest  Pelvic rest for 6 weeks   No heavy lifting greater 10 lbs or strenuous activities   Showers only  Wound care instructions and precautions given   Do NOT driving and operating machinery while taking narcotics or other sedative medications, pt voiced understanding.    Discharge Diet:  Regular diet    Discharge Instructions:      Contact the clinic or emergency department for any concerns including but not limited to an increase in her lochia, abdominal pain, foul vaginal discharge, weakness, decrease activity level, decrease appetite, feeling sad or hopeless, inability to care for her baby, breast pain or infection, difficulty with voiding or having bowel movements, perineal pain or breakdown, wound breakdown, fever >100.4 or abnormal vital signs, shortness of breath, chest pain, dizziness or feeling faint, pain or swelling in her extremities, headaches not relieved with rest and Tylenol, vision changes, seizure activities, abnormal bleeding/bruising, or any other health concerns.  Post-partum care instructions/precautions and hospital course reviewed with the patient prior to discharge.  All of the pt questions were answered, pt voiced understanding.    Follow-up Visit:  1 weeks for postpartum visit, or sooner for any concerns.       Keaton Mcallister MD

## 2022-09-07 ENCOUNTER — TELEPHONE (OUTPATIENT)
Dept: OBSTETRICS AND GYNECOLOGY | Facility: HOSPITAL | Age: 22
End: 2022-09-07
Payer: MEDICAID

## 2022-09-08 ENCOUNTER — TELEPHONE (OUTPATIENT)
Dept: OBSTETRICS AND GYNECOLOGY | Facility: CLINIC | Age: 22
End: 2022-09-08

## 2022-09-08 ENCOUNTER — POSTPARTUM VISIT (OUTPATIENT)
Dept: OBSTETRICS AND GYNECOLOGY | Facility: CLINIC | Age: 22
End: 2022-09-08
Payer: MEDICAID

## 2022-09-08 VITALS
DIASTOLIC BLOOD PRESSURE: 72 MMHG | WEIGHT: 145.63 LBS | BODY MASS INDEX: 28.59 KG/M2 | HEIGHT: 60 IN | SYSTOLIC BLOOD PRESSURE: 102 MMHG

## 2022-09-08 PROCEDURE — 99213 OFFICE O/P EST LOW 20 MIN: CPT | Mod: PBBFAC | Performed by: OBSTETRICS & GYNECOLOGY

## 2022-09-08 PROCEDURE — 99999 PR PBB SHADOW E&M-EST. PATIENT-LVL III: ICD-10-PCS | Mod: PBBFAC,,, | Performed by: OBSTETRICS & GYNECOLOGY

## 2022-09-08 PROCEDURE — 59430 PR CARE AFTER DELIVERY ONLY: ICD-10-PCS | Mod: ,,, | Performed by: OBSTETRICS & GYNECOLOGY

## 2022-09-08 PROCEDURE — 99999 PR PBB SHADOW E&M-EST. PATIENT-LVL III: CPT | Mod: PBBFAC,,, | Performed by: OBSTETRICS & GYNECOLOGY

## 2022-09-08 NOTE — TELEPHONE ENCOUNTER
Pt call was returned. All questions were answered and provided clarification. Pt had no further questions.

## 2022-09-08 NOTE — TELEPHONE ENCOUNTER
----- Message from Amie Dawson sent at 9/8/2022  4:01 PM CDT -----  Regarding: self [phone]  Type: Patient Call Back    Who called: self    What is the request in detail: needs to know if she has to keep a paper towel on the wound all day or only when using the rest room.    Can the clinic reply by [facility]? no    Would the patient rather a call back or a response via [facility]? Call back    Best call back number: [phone]

## 2022-09-08 NOTE — PROGRESS NOTES
[facility]  Ambulatory Clinic  Obstetrics & Gynecology    Date of Visit:  2022    Chief Complaint:  Post-partum visit    Subjective:      Kaye Long is a 22 y.o.  who is s/p repeat low transverse  delivery here for post-partum visit. Pt has no major complaints today.  Pt had an uneventful post-partum hospital course and has been doing well since delivery.  Pt reports baby is doing well and she is breast/bottle feeding without difficulty.  Her lochia light/resolved.  Pt denies abdominal/pelvic pain, fevers, abnormal vaginal discharge, symptoms of post-partum blues or depression, breast complaints, GI or urinary compliants.  Pt is undecided on birth control at this time.    Review of Systems:      CONSTITUTIONAL:  No fever, chills, weakness, fatigue, decreased activity  HEENT: No headaches, vision changes  LUNGS:  No shortness of breath  HEART:  No palpitations, chest pain, abnormal swelling  BREAST:  No lump, pain, redness, skin changes  GI:  No nausea, vomiting, diarrhea, constipation, abdomen pain, blood in stool  URINARY:  No dysuria, hematuria, frequency  SKIN:  No rash, bruising  NEURO:  No headache, weakness  PSYCH:  No anxiety, depression, suicidal or homicidal ideations    Objective:     /72   Ht 5' (1.524 m)   Wt 66 kg (145 lb 9.8 oz)   LMP 2021   Breastfeeding Yes   BMI 28.44 kg/m²      GENERAL:  NAD, A&Ox3, well nourished.  HEENT:  NCAT, EOMI, moist mucus membranes.  Neck supple without masses.  BREAST:  Pt deferred today.    LUNGS:  Clear normal respiratory effort  HEART:  RRR with physiologic heart sounds.  ABDOMEN:  Soft, non-tender, non-distended without any guarding, rigidity or rebound.  Normoactive bowel sounds.  Aquacel bandage removed.  Incision - clean, dry, and intact.  Healing appropriately without signs of infection.  Fundus - firm, non-tender, below umbilicus.  EXT:  Symmetric. No cramping, claudication, or edema. +2 distal  pulses. FROM.  SKIN:  No rashes, good turgor & capillary refill.  NEURO:  Grossly intact bilaterally. +2 DTR.  PSYCH:  Mood & affect appropriate.     PELVIC:  Pt deferred today.      Chaperone present for exam.    Assessment:     22 y.o.  s/p cesearean delivery at term - doing well post-partum    Plan:    Post-partum care instructions and precautions reviewed.  Wound care instructions.  Pelvic rest x 6 wks post-partum.  Continue prenatal vitamins, fergon.       F/u with PCP for health maintenance.  Encourage healthy lifestyle modifications.    Return to clinic 4 weeks for postpartum visit, or sooner as needed.  Pt voiced understanding.       Keaton Mcallister MD

## 2022-10-13 ENCOUNTER — POSTPARTUM VISIT (OUTPATIENT)
Dept: OBSTETRICS AND GYNECOLOGY | Facility: CLINIC | Age: 22
End: 2022-10-13
Payer: MEDICAID

## 2022-10-13 VITALS
DIASTOLIC BLOOD PRESSURE: 60 MMHG | BODY MASS INDEX: 27.6 KG/M2 | SYSTOLIC BLOOD PRESSURE: 104 MMHG | WEIGHT: 140.56 LBS | HEIGHT: 60 IN

## 2022-10-13 PROBLEM — O34.219 H/O CESAREAN SECTION COMPLICATING PREGNANCY: Status: RESOLVED | Noted: 2022-02-23 | Resolved: 2022-10-13

## 2022-10-13 PROBLEM — Z3A.36 36 WEEKS GESTATION OF PREGNANCY: Status: RESOLVED | Noted: 2022-08-29 | Resolved: 2022-10-13

## 2022-10-13 PROBLEM — O99.330 SMOKING (TOBACCO) COMPLICATING PREGNANCY, UNSPECIFIED TRIMESTER: Status: RESOLVED | Noted: 2022-02-23 | Resolved: 2022-10-13

## 2022-10-13 PROBLEM — Z34.90 PREGNANCY WITH ONE FETUS, ANTEPARTUM: Status: RESOLVED | Noted: 2022-02-23 | Resolved: 2022-10-13

## 2022-10-13 PROBLEM — Z98.891 S/P CESAREAN SECTION: Status: RESOLVED | Noted: 2022-08-29 | Resolved: 2022-10-13

## 2022-10-13 PROCEDURE — 99499 NO LOS: ICD-10-PCS | Mod: S$PBB,,, | Performed by: OBSTETRICS & GYNECOLOGY

## 2022-10-13 PROCEDURE — 99999 PR PBB SHADOW E&M-EST. PATIENT-LVL III: CPT | Mod: PBBFAC,,, | Performed by: OBSTETRICS & GYNECOLOGY

## 2022-10-13 PROCEDURE — 99499 UNLISTED E&M SERVICE: CPT | Mod: S$PBB,,, | Performed by: OBSTETRICS & GYNECOLOGY

## 2022-10-13 PROCEDURE — 99999 PR PBB SHADOW E&M-EST. PATIENT-LVL III: ICD-10-PCS | Mod: PBBFAC,,, | Performed by: OBSTETRICS & GYNECOLOGY

## 2022-10-13 PROCEDURE — 99213 OFFICE O/P EST LOW 20 MIN: CPT | Mod: PBBFAC | Performed by: OBSTETRICS & GYNECOLOGY

## 2022-10-13 NOTE — PROGRESS NOTES
[facility]  Ambulatory Clinic  Obstetrics & Gynecology    Date of Visit:  10/13/2022    Chief Complaint:  Post-partum visit    Subjective:      Kaye Long is a 22 y.o.  who is s/p repeat low transverse  delivery here for post-partum visit.   Pt has no major complaints today.  Pt been doing well since delivery.    Baby is doing well and she is breast/bottle feeding without difficulty.    Lochia resolved.    Pt denies abdominal/pelvic pain, fevers, abnormal vaginal discharge, symptoms of post-partum blues or depression, breast complaints, GI or urinary compliants.    Pt is requesting Nexplanon.    Review of Systems:      CONSTITUTIONAL:  No fever, chills, weakness, fatigue, decreased activity  HEENT: No headaches, vision changes  LUNGS:  No shortness of breath  HEART:  No palpitations, chest pain, abnormal swelling  BREAST:  No lump, pain, redness, skin changes  GI:  No nausea, vomiting, diarrhea, constipation, abdomen pain, blood in stool  URINARY:  No dysuria, hematuria, frequency  SKIN:  No rash, bruising  NEURO:  No headache, weakness  PSYCH:  No anxiety, depression, suicidal or homicidal ideations    Objective:     /60   Ht 5' (1.524 m)   Wt 63.7 kg (140 lb 8.7 oz)   LMP 10/11/2022   Breastfeeding No   BMI 27.45 kg/m²   Pulse 50's, Resp rate 14    GENERAL:  NAD, A&Ox3, well nourished.  HEENT:  NCAT, EOMI, moist mucus membranes.  Neck supple without masses.  BREAST:  Pt deferred today.  LUNGS:  Clear normal respiratory effort  HEART:  RRR with physiologic heart sounds.  ABDOMEN:  Soft, non-tender, non-distended without any guarding, rigidity or rebound.  Normoactive bowel sounds.  Incision well healed.  EXT:  Symmetric. No cramping, claudication, or edema. +2 distal pulses. FROM.  SKIN:  No rashes, good turgor & capillary refill.  NEURO:  Grossly intact bilaterally. +2 DTR.  PSYCH:  Mood & affect appropriate.     GENITOURINARY:  NFEG no lesion. No vaginal or  cervical lesion. No bleeding or discharge. Good support. No CMT. Uterus and ovaries small, NT. Deferred rectal exam. No obvious external lesions.    Chaperone present for exam.    Assessment:     22 y.o.  s/p cesearean delivery at term - doing well post-partum  Family planning - order Nexplanon    Plan:    Post-partum care instructions and precautions reviewed.   Continue prenatal vitamins, fergon.     We discussed in detail her contraceptive options.  After an extensive discussion, pt is requesting Nexplanon.  Risks, benefits, and alternatives to Nexplanon discussed.  Will order Nexplanon pending insurance benefits verification process  F/u with PCP for health maintenance.  Encourage healthy lifestyle modifications.  Will schedule pt for Nexplanon insertion once received by office.   Return sooner as needed.    All questions answered, voiced understanding.      Keaton Mcallister MD

## 2022-10-20 ENCOUNTER — TELEPHONE (OUTPATIENT)
Dept: OBSTETRICS AND GYNECOLOGY | Facility: CLINIC | Age: 22
End: 2022-10-20
Payer: MEDICAID

## 2022-10-20 NOTE — TELEPHONE ENCOUNTER
Called to inform patient her birth control was approved. Pt scheduled based on cycle. All questions answered, pt voiced understanding

## 2022-11-07 ENCOUNTER — PROCEDURE VISIT (OUTPATIENT)
Dept: OBSTETRICS AND GYNECOLOGY | Facility: CLINIC | Age: 22
End: 2022-11-07
Payer: MEDICAID

## 2022-11-07 ENCOUNTER — PATIENT MESSAGE (OUTPATIENT)
Dept: OBSTETRICS AND GYNECOLOGY | Facility: CLINIC | Age: 22
End: 2022-11-07

## 2022-11-07 VITALS
HEIGHT: 60 IN | BODY MASS INDEX: 28.42 KG/M2 | SYSTOLIC BLOOD PRESSURE: 102 MMHG | WEIGHT: 144.75 LBS | DIASTOLIC BLOOD PRESSURE: 58 MMHG

## 2022-11-07 DIAGNOSIS — Z32.02 PREGNANCY TEST NEGATIVE: ICD-10-CM

## 2022-11-07 DIAGNOSIS — Z30.017 NEXPLANON INSERTION: Primary | ICD-10-CM

## 2022-11-07 LAB
B-HCG UR QL: NEGATIVE
CTP QC/QA: YES

## 2022-11-07 PROCEDURE — 99499 NO LOS: ICD-10-PCS | Mod: S$PBB,,, | Performed by: OBSTETRICS & GYNECOLOGY

## 2022-11-07 PROCEDURE — 11981 PR INSERT, DRUG DELIVERY IMPLANT, BIORESORB/BIODEGR/NON-BIODEGR: ICD-10-PCS | Mod: S$PBB,,, | Performed by: OBSTETRICS & GYNECOLOGY

## 2022-11-07 PROCEDURE — 11981 INSERTION DRUG DLVR IMPLANT: CPT | Mod: S$PBB,,, | Performed by: OBSTETRICS & GYNECOLOGY

## 2022-11-07 PROCEDURE — 99499 UNLISTED E&M SERVICE: CPT | Mod: S$PBB,,, | Performed by: OBSTETRICS & GYNECOLOGY

## 2022-11-07 PROCEDURE — 81025 URINE PREGNANCY TEST: CPT | Mod: PBBFAC | Performed by: OBSTETRICS & GYNECOLOGY

## 2022-11-07 PROCEDURE — 11981 INSERTION DRUG DLVR IMPLANT: CPT | Mod: PBBFAC | Performed by: OBSTETRICS & GYNECOLOGY

## 2022-11-08 NOTE — PROCEDURES
[facility]  Ambulatory Clinic   Obstetrics & Gynecology    Date:  2022    Procedure:  Nexplanon insertion (CPT 04881, NDC 7965-2449-96)    LMP:  Patient's last menstrual period was 2022.    UPT:  Negative    Indication:  Desired long-term, reversible contraception     History:      Kaye Long is a 22 y.o.  desires implant for contraception with Nexplanon.  Currently currently on menses.  Pt has no major complaints today.       Consents:     We discussed the risks, benefits, indications, and alternatives to the Nexplanon including but not limited to risk of bleeding, infection, and scarring at insertion site and dysfunctional uterine bleeding.  All of her questions were answered to her satisfaction. Pt voiced understanding and written informed consents obtained.     Vitals:  BP (!) 102/58   Ht 5' (1.524 m)   Wt 65.6 kg (144 lb 11.7 oz)   LMP 2022   BMI 28.27 kg/m²     Procedure Details:      A time out was performed to confirmed the correct patient and procedure.    Insertion site:  Left arm      Lot # [id number]    Expiration Date:  24     Insertion site was selected 8 - 10 cm from medial epicondyle overlying the triceps muscle and marked along with guiding site using sterile marker.    Procedure area was prepped and draped in a sterile fashion.    2 mL of 1% lidocaine with epinephrine was injected at the insertion site.    Nexplanon trocar was inserted subcutaneously and then Nexplanon capsule delivered subcutaneously.    Trocar was removed from the insertion site.    Nexplanon capsule was palpated by provider and patient to assure satisfactory placement.    A steri-strip and pressure dressing were applied.    Pt tolerated the procedure well.  Sterile technique was maintained.  Hemostasis noted.  VSSAF, pain scale 0/10 at end of procedure.    Complications:  None    Follow-up:     Standard post-procedure care is explained and return precautions are  given.    Manage post Nexplanon placement pain with NSAIDS, Tylenol prn.    Pt was clearly reminded that the Nexplanon will need to be removed within 3 years from date of insertion.    Return 4 weeks for Nexplanon check, or sooner for any concerns.  All questions answered, pt voiced understanding.       Keaton Mcallister MD

## 2023-11-27 ENCOUNTER — OFFICE VISIT (OUTPATIENT)
Dept: OBSTETRICS AND GYNECOLOGY | Facility: CLINIC | Age: 23
End: 2023-11-27
Payer: MEDICAID

## 2023-11-27 VITALS
WEIGHT: 127.88 LBS | DIASTOLIC BLOOD PRESSURE: 62 MMHG | SYSTOLIC BLOOD PRESSURE: 112 MMHG | HEIGHT: 60 IN | BODY MASS INDEX: 25.11 KG/M2

## 2023-11-27 DIAGNOSIS — Z01.419 WELL WOMAN EXAM WITH ROUTINE GYNECOLOGICAL EXAM: Primary | ICD-10-CM

## 2023-11-27 DIAGNOSIS — Z12.4 CERVICAL CANCER SCREENING: ICD-10-CM

## 2023-11-27 DIAGNOSIS — F41.9 ANXIETY: ICD-10-CM

## 2023-11-27 PROCEDURE — 99395 PREV VISIT EST AGE 18-39: CPT | Mod: S$PBB,,, | Performed by: OBSTETRICS & GYNECOLOGY

## 2023-11-27 PROCEDURE — 3008F BODY MASS INDEX DOCD: CPT | Mod: CPTII,,, | Performed by: OBSTETRICS & GYNECOLOGY

## 2023-11-27 PROCEDURE — 1160F RVW MEDS BY RX/DR IN RCRD: CPT | Mod: CPTII,,, | Performed by: OBSTETRICS & GYNECOLOGY

## 2023-11-27 PROCEDURE — 3078F PR MOST RECENT DIASTOLIC BLOOD PRESSURE < 80 MM HG: ICD-10-PCS | Mod: CPTII,,, | Performed by: OBSTETRICS & GYNECOLOGY

## 2023-11-27 PROCEDURE — 99999 PR PBB SHADOW E&M-EST. PATIENT-LVL III: ICD-10-PCS | Mod: PBBFAC,,, | Performed by: OBSTETRICS & GYNECOLOGY

## 2023-11-27 PROCEDURE — 3074F PR MOST RECENT SYSTOLIC BLOOD PRESSURE < 130 MM HG: ICD-10-PCS | Mod: CPTII,,, | Performed by: OBSTETRICS & GYNECOLOGY

## 2023-11-27 PROCEDURE — 1159F PR MEDICATION LIST DOCUMENTED IN MEDICAL RECORD: ICD-10-PCS | Mod: CPTII,,, | Performed by: OBSTETRICS & GYNECOLOGY

## 2023-11-27 PROCEDURE — 3074F SYST BP LT 130 MM HG: CPT | Mod: CPTII,,, | Performed by: OBSTETRICS & GYNECOLOGY

## 2023-11-27 PROCEDURE — 1160F PR REVIEW ALL MEDS BY PRESCRIBER/CLIN PHARMACIST DOCUMENTED: ICD-10-PCS | Mod: CPTII,,, | Performed by: OBSTETRICS & GYNECOLOGY

## 2023-11-27 PROCEDURE — 99999 PR PBB SHADOW E&M-EST. PATIENT-LVL III: CPT | Mod: PBBFAC,,, | Performed by: OBSTETRICS & GYNECOLOGY

## 2023-11-27 PROCEDURE — 3008F PR BODY MASS INDEX (BMI) DOCUMENTED: ICD-10-PCS | Mod: CPTII,,, | Performed by: OBSTETRICS & GYNECOLOGY

## 2023-11-27 PROCEDURE — 3078F DIAST BP <80 MM HG: CPT | Mod: CPTII,,, | Performed by: OBSTETRICS & GYNECOLOGY

## 2023-11-27 PROCEDURE — 99213 OFFICE O/P EST LOW 20 MIN: CPT | Mod: PBBFAC | Performed by: OBSTETRICS & GYNECOLOGY

## 2023-11-27 PROCEDURE — 88175 CYTOPATH C/V AUTO FLUID REDO: CPT | Performed by: OBSTETRICS & GYNECOLOGY

## 2023-11-27 PROCEDURE — 1159F MED LIST DOCD IN RCRD: CPT | Mod: CPTII,,, | Performed by: OBSTETRICS & GYNECOLOGY

## 2023-11-27 PROCEDURE — 99395 PR PREVENTIVE VISIT,EST,18-39: ICD-10-PCS | Mod: S$PBB,,, | Performed by: OBSTETRICS & GYNECOLOGY

## 2023-11-27 RX ORDER — ESCITALOPRAM OXALATE 10 MG/1
10 TABLET ORAL DAILY
Qty: 30 TABLET | Refills: 3 | Status: SHIPPED | OUTPATIENT
Start: 2023-11-27 | End: 2024-04-03

## 2023-11-27 NOTE — PROGRESS NOTES
"[facility]  Ambulatory Clinic  Obstetrics & Gynecology    Visit Date:  2023    Chief Complaint:  Annual GYN exam    History of Present Illness:      Kaye Long is a 23 y.o.  here for a gynecologic exam with c/o anxiety.    Pt c/o feeling axious "sometimes".  Pt has been on medications in the past for anxiety.  Pt absolutely denies suicidal or homicidal ideations.    Menses are overall light/few on Nexplanon, not painful.    Pt is tolerating Nexplanon well.    Last pap ~ was benign.    Pt denies abnormal vaginal bleeding, vaginal discharge, dysmenorrhea, dyspareunia, pelvic pain, bloating, early satiety, unintentional weight loss, breast mass/skin changes, incontinence, GI or urinary complaints.      Otherwise, the pt is in her usual state of health.    Past History:  Gynecologic history as noted above.    Review of Systems:      GENERAL:  No fever, fatigue, excessive weight gain or loss  HEENT:  No headaches, hearing changes, visual disturbance  RESPIRATORY:  No cough, shortness of breath  CARDIOVASCULAR:  No chest pain, heart palpitations, leg swelling  BREAST:  No lump, pain, nipple discharge, skin changes  GASTROINTESTINAL:  No nausea, vomiting, constipation, diarrhea, abd pain, rectal bleeding   GENITOURINARY:  See HPI  ENDOCRINE:  No heat or cold intolerance  HEMATOLOGIC:  No easy bruisability or bleeding   LYMPHATICS:  No enlarged nodes  MUSCULOSKELETAL:  No acute joint pain or swelling  SKIN:  No rash, lesions, jaundice  NEUROLOGIC:  No dizziness, weakness, syncope  PSYCHIATRIC:  No homicidal/suicidal ideations, abuse    Physical Exam:     /62   Ht 5' (1.524 m)   Wt 58 kg (127 lb 13.9 oz)   LMP 10/30/2023   BMI 24.97 kg/m²   Pulse 60's, Resp rate 14     GENERAL:  No acute distress, well-nourished  HEENT:  Atraumatic, anicteric, moist mucus membranes. Neck supple w/o masses.  BREAST:  Symmetric, nontender, no obvious masses, adenopathy, skin changes or " nipple discharge.  LUNGS:  Clear normal respiratory effort  HEART:  Regular rate and rhythm  ABDOMEN:  Soft, non-tender, non-distended, normoactive bowel sounds, no obvious organomegaly  EXT:  Symmetric w/o cramping, claudication, or edema. +2 distal pulses.  SKIN:  No rashes or bruising  PSYCH:  Mood and affect appropriate  NEURO:  Grossly intact bilaterally    GENITOURINARY:    VULVAR:  Female external genitalia w/o obvious lesions. Female hair distribution. Normal urethral meatus. No gross lymphadenopathy.    VAGINA:  Normal vaginal mucosa. Good support. No obvious lesion. No discharge.  CERVIX:  No cervical motion tenderness, discharge, or obvious lesions.   UTERUS:  Small, non-tender, normal contour  ADNEXA:  No masses, non-tender    RECTAL:  Deferred. No obvious external lesions    Chaperone present for exam.    Assessment:     23 y.o.  with Nexplanon:    Well woman gynecologic exam  Family planning - order Nexplanon  Anxiety    Plan:    A gynecologic health assessment was performed with age appropriate counseling.    Cervical cancer screening - pap obtained.    STI screening - pt declined.    Continue Nexplanon.  Risks, benefits, and alternatives to Nexplanon reviewed.  Pt advised Nexplanon will need to be removed in 3 years from date of insertion.      We discussed natural course anxiety/mood disorder. Pt is interested in a trial of  Lexapro given to pt until she can see psych.  Risks, benefits, and alternatives to Lexapro reviewed.  Refer to psych, a list of community psych provider given to pt to call and schedule f/u.  Mental hygiene advice.  Call 911 if feelings of hopelessness or suicidal/homicidal ideations.    Encourage healthy lifestyle modifications, monthly self breast exams.    F/u with PCP for health maintenance.    Return 1 year for gynecologic exam or sooner as needed.  All questions answered, pt voiced understanding.        Keaton Mcallister MD

## 2023-12-04 LAB
FINAL PATHOLOGIC DIAGNOSIS: NORMAL
Lab: NORMAL

## 2024-04-03 DIAGNOSIS — F41.9 ANXIETY: ICD-10-CM

## 2024-04-03 RX ORDER — ESCITALOPRAM OXALATE 10 MG/1
10 TABLET ORAL
Qty: 90 TABLET | Refills: 2 | Status: SHIPPED | OUTPATIENT
Start: 2024-04-03

## 2024-04-03 NOTE — TELEPHONE ENCOUNTER
Refill Decision Note   Kaye Long  is requesting a refill authorization.  Brief Assessment and Rationale for Refill:  Approve     Medication Therapy Plan:        Comments:     Note composed:11:56 AM 04/03/2024

## 2024-06-05 ENCOUNTER — NURSE TRIAGE (OUTPATIENT)
Dept: ADMINISTRATIVE | Facility: CLINIC | Age: 24
End: 2024-06-05
Payer: MEDICAID

## 2024-06-05 ENCOUNTER — TELEPHONE (OUTPATIENT)
Dept: OBSTETRICS AND GYNECOLOGY | Facility: CLINIC | Age: 24
End: 2024-06-05
Payer: MEDICAID

## 2024-06-05 NOTE — TELEPHONE ENCOUNTER
[place] back from NP  Pt called back to let her know that she can go to UC as well and to call back if any other concerns

## 2024-06-05 NOTE — TELEPHONE ENCOUNTER
Pt told to call back if any worsening abdominal pain or fever and if any other questions or concerns. If no response from provider to go to the ED

## 2024-06-05 NOTE — TELEPHONE ENCOUNTER
Pt calling with c/o abdominal since last night on the right side and she said that she has the nexolanon and has had a period but they havent been normal and then started with this pain last night and abdomen very tender to touch on the right side, Pt triaged and care advice to the ED or PCP triage and will route message to provider. I will also send to NP for recommendations as for care                  Reason for Disposition   MILD TO MODERATE constant pain lasting > 2 hours    Additional Information   Negative: Passed out (i.e., fainted, collapsed and was not responding)   Negative: Shock suspected (e.g., cold/pale/clammy skin, too weak to stand, low BP, rapid pulse)   Negative: Sounds like a life-threatening emergency to the triager   Negative: SEVERE abdominal pain (e.g., excruciating)   Negative: Vomiting red blood or black (coffee ground) material   Negative: Blood in bowel movements  (Exception: Blood on surface of BM with constipation.)   Negative: Black or tarry bowel movements  (Exception: Chronic-unchanged black-grey BMs AND is taking iron pills or Pepto-Bismol.)    Protocols used: Abdominal Pain - Female-A-[place]

## 2024-06-05 NOTE — TELEPHONE ENCOUNTER
Pt call was returned. Pt will come into the office on Friday at 300 pm.    ----- Message from Galilea Marino sent at 6/5/2024  9:17 AM CDT -----  Regarding: Self [phone]  Type: Patient Call Back     Who called: Self     What is the request in detail: Pt is requesting to have her nexplanon removed. Please contact pt to schedule.     Can the clinic reply by [facility]? No     Would the patient rather a call back or a response via [facility]? Call     Best call back number: [phone]       Additional Information:

## 2024-06-06 ENCOUNTER — NURSE TRIAGE (OUTPATIENT)
Dept: ADMINISTRATIVE | Facility: CLINIC | Age: 24
End: 2024-06-06
Payer: MEDICAID

## 2024-06-07 ENCOUNTER — PROCEDURE VISIT (OUTPATIENT)
Dept: OBSTETRICS AND GYNECOLOGY | Facility: CLINIC | Age: 24
End: 2024-06-07
Payer: MEDICAID

## 2024-06-07 ENCOUNTER — CLINICAL SUPPORT (OUTPATIENT)
Dept: OBSTETRICS AND GYNECOLOGY | Facility: CLINIC | Age: 24
End: 2024-06-07
Payer: MEDICAID

## 2024-06-07 VITALS
SYSTOLIC BLOOD PRESSURE: 124 MMHG | DIASTOLIC BLOOD PRESSURE: 86 MMHG | HEIGHT: 60 IN | BODY MASS INDEX: 27.72 KG/M2 | WEIGHT: 141.19 LBS

## 2024-06-07 DIAGNOSIS — A54.9 GONORRHEA: ICD-10-CM

## 2024-06-07 DIAGNOSIS — Z30.46 ENCOUNTER FOR NEXPLANON REMOVAL: Primary | ICD-10-CM

## 2024-06-07 DIAGNOSIS — A54.9 GONORRHEA: Primary | ICD-10-CM

## 2024-06-07 PROCEDURE — 99499 UNLISTED E&M SERVICE: CPT | Mod: S$PBB,,, | Performed by: OBSTETRICS & GYNECOLOGY

## 2024-06-07 PROCEDURE — 11982 REMOVE DRUG IMPLANT DEVICE: CPT | Mod: S$PBB,,, | Performed by: OBSTETRICS & GYNECOLOGY

## 2024-06-07 PROCEDURE — 11982 REMOVE DRUG IMPLANT DEVICE: CPT | Mod: PBBFAC | Performed by: OBSTETRICS & GYNECOLOGY

## 2024-06-07 NOTE — TELEPHONE ENCOUNTER
Reason for Disposition   Caller hangs up    Additional Information   Caller hangs up    Protocols used: No Contact or Duplicate Contact Call-A-AH, Difficult Call-A-  Pt's spouse called in asking general questions about STD testing prior to adding patient to the call. He added patient to the call and when she was asked to spell 1st & last name and provide , she spelled name and then hung up.

## 2024-06-07 NOTE — PROCEDURES
[facility]  Ambulatory Clinic   Obstetrics & Gynecology    Date:  2024    Procedure:  Nexplanon removal (CPT 23803)    LMP:  Patient's last menstrual period was 2024.    UPT:  Negative    Indication:  Change in birth control method    History:      Kaye Long is a 24 y.o. , here for Nexplanon removal.  Pt was recently seen in ED for UTI sxs.  Pt was found to have positive gonorrhea infection and has not been treated.  Pt denies pelvic pain or vaginal discharge.     We discussed the risks, benefits, alternatives, and possible complications to Nexplanon removal and all indicated procedures in detail.  All of her questions were answered to her satisfaction.  She voiced understanding and informed consent was obtained.    Vitals:  /86   Ht 5' (1.524 m)   Wt 64 kg (141 lb 3.3 oz)   LMP 2024   BMI 27.58 kg/m²   Pulse 70's, Resp rate 12    GENERAL:  NAD, well-nourished  HEENT:  NCAT, anicteric, moist mucus membranes. Neck supple w/o masses.  LUNGS:  Clear normal respiratory effort  HEART:  RRR, no murmurs, gallops, or rubs  ABDOMEN:  Soft, non-tender, non-distended. Normoactive BS. No obvious organomegaly.  EXT:  Symmetric w/o cramping, claudication, or edema. +2 distal pulses.  SKIN:  No rashes or bruising  NEURO:  Grossly intact bilaterally  PSYCH:  Mood and affect appropriate  PELVIC:  Patient deferred today    Procedure Details:      A time out was performed to confirmed the correct patient and procedure.      The procedure site was prepped with betadine.      Nexplanon was palpated in good position along the medial aspect of left arm between biceps and triceps.    2 mL of 1% lidocaine with epinephrine was injected at the site of incision near the tip of Nexplanon is closest to the elbow.    A 2-3 mm incision in the longitudinal direction of the arm at the tip of the implant closest to the elbow was made with a scalpel.      The Nexplanon was pushed toward  the incision until the tip is visible and grasped curved mosquito forceps and gently pulled out without difficulty.    The incision was re-approximated with steri-strips and pressure bandage with sterile gauze was applied.    Patient tolerated the procedure well.  Sterile technique maintained.  Hemostasis noted.  VSSAF, pain scale 0/10 at end of procedure.    Chaperone present for exam.    Assessment:      Nexplanon removal  Gonorrhea    Plan:      Usual post procedure warnings and aftercare instructions reviewed.  Pt was advised to call for any fever or prolonged or severe pain, bleeding, or swelling.  She was also advised to use OTC analgesics as needed for mild to moderate pain.      Discussed contraceptive options.  Pt will practice natural family planning.    We discuss her positive gonorrhea.  Ceftriaxone 250 mg IM x 1 will be given today in clinic.  Advised all partners to seek STI testing/tx.  Safe sex, pelvic rest until negative test of cure.  Pelvic/PID precautions reviewed.     All of her questions were answered to her satisfaction, pt voiced understanding.     Follow-up:  4 wks for repeat gonorrhea/chlamydia testing, or sooner for any concerns.       Keaton Mcallister MD

## 2024-06-07 NOTE — PROGRESS NOTES
Pt arrived rocephin injection NOT given. Spoke with Dr Mcallister pt had received IV Rocephin in the ER on 6/5/2024 so pt was pulled to the back and explained that she doesn't need the injection as well even if she has been sexually active since then she is covered. Pt verbalized understanding.

## 2024-07-05 ENCOUNTER — OFFICE VISIT (OUTPATIENT)
Dept: OBSTETRICS AND GYNECOLOGY | Facility: CLINIC | Age: 24
End: 2024-07-05
Payer: MEDICAID

## 2024-07-05 VITALS
DIASTOLIC BLOOD PRESSURE: 70 MMHG | WEIGHT: 141.94 LBS | SYSTOLIC BLOOD PRESSURE: 108 MMHG | HEIGHT: 60 IN | BODY MASS INDEX: 27.87 KG/M2

## 2024-07-05 DIAGNOSIS — Z11.3 SCREEN FOR STD (SEXUALLY TRANSMITTED DISEASE): ICD-10-CM

## 2024-07-05 DIAGNOSIS — Z20.2 EXPOSURE TO GONORRHEA: Primary | ICD-10-CM

## 2024-07-05 PROCEDURE — 87491 CHLMYD TRACH DNA AMP PROBE: CPT | Performed by: OBSTETRICS & GYNECOLOGY

## 2024-07-05 PROCEDURE — 87591 N.GONORRHOEAE DNA AMP PROB: CPT | Performed by: OBSTETRICS & GYNECOLOGY

## 2024-07-05 PROCEDURE — 99213 OFFICE O/P EST LOW 20 MIN: CPT | Mod: PBBFAC | Performed by: OBSTETRICS & GYNECOLOGY

## 2024-07-05 PROCEDURE — 99999 PR PBB SHADOW E&M-EST. PATIENT-LVL III: CPT | Mod: PBBFAC,,, | Performed by: OBSTETRICS & GYNECOLOGY

## 2024-07-05 NOTE — PROGRESS NOTES
[facility]  Ambulatory Clinic  Obstetrics & Gynecology    Visit Date:  2024    Chief Complaint:  STD testing    History of Present Illness:      Kaye Long is a 24 y.o.  here requesting gonorrhea/chlamydia testing.  Pt was treated for gonorrhea earlier this year.  Pt denies vaginal discharge or pelvic pain.  Pt denies abnormal vaginal bleeding, vaginal discharge, dysmenorrhea, dyspareunia, pelvic pain, bloating, early satiety, unintentional weight loss, GI or urinary complaints.      Review of Systems:      GENERAL:  No fever, fatigue, excessive weight gain or loss  HEENT:  No headaches, hearing changes, visual disturbance  RESPIRATORY:  No cough, shortness of breath  CARDIOVASCULAR:  No chest pain, heart palpitations, leg swelling  GASTROINTESTINAL:  No nausea, vomiting, abd pain, rectal bleeding   GENITOURINARY:  See HPI    Physical Exam:     /70   Ht 5' (1.524 m)   Wt 64.4 kg (141 lb 14.7 oz)   LMP 2024   BMI 27.72 kg/m²   Pulse 70's, Resp rate 12     GENERAL:  No acute distress, well-nourished  HEENT:  Atraumatic, anicteric, moist mucus membranes.   LUNGS:  Clear normal respiratory effort  HEART:  Regular rate and rhythm  ABDOMEN:  Soft, non-tender, non-distended, normoactive bowel sounds, no obvious organomegaly    GENITOURINARY:    VULVAR:  Female external genitalia w/o obvious lesions. Female hair distribution. Normal urethral meatus. No gross lymphadenopathy.    VAGINA:  Normal vaginal mucosa. Good support. No obvious lesion. No discharge.  CERVIX:  No cervical motion tenderness, discharge, or obvious lesions.   UTERUS:  Small, non-tender, normal contour  ADNEXA:  No masses, non-tender    RECTAL:  Deferred. No obvious external lesions    Chaperone present for exam.    Assessment:     24 y.o. :    Exposure to gonorrhea  STD testing    Plan:    Discussed STD testing  Pt requesting only gonorrhea/chlamydia testing.  Safe sex.  Encourage healthy  lifestyle modifications.  F/u with PCP for health maintenance.   Return 11/2024 for gynecologic exam or sooner as needed, pt advised to call and schedule.  All questions answered, pt voiced understanding.        Keaton Mcallister MD

## 2025-01-24 ENCOUNTER — TELEPHONE (OUTPATIENT)
Dept: ORTHOPEDICS | Facility: CLINIC | Age: 25
End: 2025-01-24
Payer: MEDICAID
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
## 2025-01-24 PROBLEM — M79.622 LEFT UPPER ARM PAIN: Status: ACTIVE | Noted: 2025-01-24

## 2025-01-24 NOTE — TELEPHONE ENCOUNTER
----- Message from Jam sent at 1/24/2025  1:35 PM CST -----  Type:  Sooner Apoointment Request    Caller is requesting a sooner appointment.   Name of Caller:pt   When is the first available appointment?05/08  Symptoms:  Fall [W19.XXXA]  Left upper arm pain [M79.622]      Would the patient rather a call back or a response via [facility]? Call   Best Call Back Number: [phone]  Additional Information:

## 2025-01-24 NOTE — TELEPHONE ENCOUNTER
Spoke with patient. Pt advise to call her insurance to see if she can be see somewhere else sooner. Sooner available in [facility] is May.

## (undated) DEVICE — DRESSING AQUACEL AG ADV 3.5X12